# Patient Record
Sex: MALE | Race: WHITE | Employment: OTHER | URBAN - METROPOLITAN AREA
[De-identification: names, ages, dates, MRNs, and addresses within clinical notes are randomized per-mention and may not be internally consistent; named-entity substitution may affect disease eponyms.]

---

## 2018-11-30 NOTE — ED AVS SNAPSHOT
Rakesh Jang   MRN: [id number]    Department:  [facility] Emergency Department   Date of Visit:  11/2/2019           Disclosure     Insurance plans vary and the physician(s) referred by the ER may not be covered by your plan.  Please contact your Verified Results  CBC WITH AUTOMATED DIFFERENTIAL [id number] 12:04PM YAMILETH GILES     Test Name Result Flag Reference   WHITE BLOOD COUNT 4.5 K/mcL  4.2-11.0   RED CELL COUNT 4.10 mil/mcL  4.00-5.20   HEMOGLOBIN 12.8 g/dl  12.0-15.5   HEMATOCRIT 38.5 %  36.0-46.5   MEAN CORPUSCULAR VOLUME 93.9 fL  78.0-100.0   MEAN CORPUSCULAR HEMOGLOBIN 31.2 pg  26.0-34.0   MEAN CORPUSCULAR HGB CONC 33.2 g/dl  32.0-36.5   RDW-CV 11.9 %  11.0-15.0   PLATELET COUNT 128 K/mcL  140-450   LORETO% 52 %     LYM% 37 %     MON% 9 %     EOS% 1 %     BASO% 1 %     LORETO ABS 2.4 K/mcL  1.8-7.7   LYM ABS 1.7 K/mcL  1.0-4.0   MON ABS 0.4 K/mcL  0.3-0.9   EOS ABS 0.1 K/mcL  0.1-0.5   BASO ABS 0.0 K/mcL  0.0-0.3   DIFF TYPE      AUTOMATED DIFFERENTIAL   NRBC 0 /100 WBC  0   PERCENT IMMATURE GRANULOCYTES 0 %     ABSOLUTE IMMATURE GRANULOCYTES 0.0 K/mcL  0-5.0     COMP METABOLIC PNL [id number] 84:23HU Ming Max     Test Name Result Flag Reference   SODIUM 138 mmol/L  135-145   POTASSIUM 4.0 mmol/L  3.4-5.1   CHLORIDE 101 mmol/L     CARBON DIOXIDE 28 mmol/L  21-32   ANION GAP 13 mmol/L  10-20   GLUCOSE 100 mg/dl H 65-99   BUN 15 mg/dl  6-20   CREATININE 0.77 mg/dl  0.51-0.95   GFR EST.  AMER >90     eGFR results = or >90 mL/min/1.73m2 = Normal kidney function. GFR EST. NONAFRI AMER 89     eGFR 60 - 89 mL/min/1.73m2 = Mild decrease in kidney function.    BUN/CREATININE RATIO 19  7-25   BILIRUBIN TOTAL 0.5 mg/dl  0.2-1.0   GOT/AST 15 Units/L  <38   ALKALINE PHOSPHATASE 80 Units/L     ALBUMIN 4.4 g/dl  3.6-5.1   TOTAL PROTEIN 7.1 g/dl  6.4-8.2   GLOBULIN (CALCULATED) 2.7 g/dl  2.0-4.0   A/G RATIO 1.6  1.0-2.4   CALCIUM 10.3 mg/dl H 8.4-10.2   GPT/ALT 22 Units/L  <79   FASTING STATUS 12 hrs       LIPID PNL [id number] 12:04PM YAMILETH GILES     Test Name Result Flag Reference   FASTING STATUS 12 hrs     CHOLESTEROL 264 mg/dl H <200   Desirable            <200  Borderline High      200 to 239  High                 >=240   HDL CHOLESTEROL 83 mg/dl  >49   Low            <40  Borderline Low 40 to 49  Near Optimal   50 to 59  Optimal        >=60   TRIGLYCERIDES 67 mg/dl  <150   Normal                   <150  Borderline High          150 to 199  High                     200 to 499  Very High                >=500   LDL CHOLESTEROL (CALCULATED) 168 mg/dl H <130   OPTIMAL               <100  NEAR OPTIMAL          100-129  BORDERLINE HIGH       130-159  HIGH                  160-189  VERY HIGH             >=190   NON-HDL CHOLESTEROL 181 mg/dl     Therapeutic Target:  CHD and risk equivalents <130  Multiple risk factors    <160  0 to 1 risk factors      <190   CHOLESTEROL/HDL RATIO 3.2  <4.5     HEMOGLOBIN A1C GLYCOSYLATED [id number] 12:04PM GILESYAMILETH MUNOZ     Test Name Result Flag Reference   HEMOGLOBIN A1C GLYH 5.2 %  4.5-5.6   ----DIABETIC SCREENING---  NON DIABETIC                 <5.7%  INCREASED RISK                5.7-6.4%  DIAGNOSTIC FOR DIABETES      >6.4%     ----DIABETIC CONTROL---     A1C%           eAG mg/dL  6.0            126  6.5            140  7.0            154  7.5            169  8.0            183  8.5            197  9.0            212  9.5            226  10.0           240     TSH WITH REFLEX [id number] 12:04PM GILES, YAMILETH     Test Name Result Flag Reference   TSH 1.424 mcUnits/mL  0.350-5.000   Findings most consistent with euthyroid state, no additional testing suggested. TSH may be normal in patients with thyroid dysfunction and pituitary disease. Clinical correlation recommended. (Reflex TSH algorithm is not recommended in hospitalized patients. A variety of drugs, as well as serious acute and chronic illnesses may alter thyroid function tests.  Commonly implicated drugs include glucocorticoids, dopamine, carbamazepine, iodine, amiodarone, lithium and heparin.)     VITAMIN D,25 HYDROXY [id number] 12:04PM GILESYAMILETH MUNOZ     Test Name Result Flag Reference   VIT D,25 HYDROXY 61.7 ng/ml  30.0-100.0   <20  ng/mL=Vitamin D within the next three months to obtain basic health screening including reassessment of your blood pressure.     IF THERE IS ANY CHANGE OR WORSENING OF YOUR CONDITION, CALL YOUR PRIMARY CARE PHYSICIAN AT ONCE OR RETURN IMMEDIATELY TO THE EMERGENCY DEPARTMEN deficiency  20-29  ng/mL=Vitamin D insufficiency   ng/mL=Optimal Vitamin D  >150 ng/mL=Possible toxicity     VITAMIN B12 [id number] 12:04PM Ladena Mo     Test Name Result Flag Reference   VITAMIN B12 627 pg/ml  211-911       Message   Borderline increased calclum level. Recommend re-check in 4-6 weeks. No concerns otherwise. Ordered CMP.

## 2019-01-16 ENCOUNTER — HOSPITAL ENCOUNTER (OUTPATIENT)
Facility: HOSPITAL | Age: 84
Setting detail: OBSERVATION
Discharge: NURSING, REHAB OR LONG-TERM CARE | End: 2019-01-17
Attending: EMERGENCY MEDICINE | Admitting: INTERNAL MEDICINE
Payer: MEDICARE

## 2019-01-16 DIAGNOSIS — N39.0 URINARY TRACT INFECTION WITHOUT HEMATURIA, SITE UNSPECIFIED: Primary | ICD-10-CM

## 2019-01-16 DIAGNOSIS — R53.1 WEAKNESS GENERALIZED: ICD-10-CM

## 2019-01-16 LAB
ANION GAP SERPL CALC-SCNC: 14 MMOL/L (ref 0–18)
BASOPHILS # BLD: 0 K/UL (ref 0–0.2)
BASOPHILS NFR BLD: 0 %
BILIRUB UR QL: NEGATIVE
BUN SERPL-MCNC: 18 MG/DL (ref 8–20)
BUN/CREAT SERPL: 22.2 (ref 10–20)
CALCIUM SERPL-MCNC: 9.2 MG/DL (ref 8.5–10.5)
CHLORIDE SERPL-SCNC: 102 MMOL/L (ref 95–110)
CO2 SERPL-SCNC: 21 MMOL/L (ref 22–32)
COLOR UR: YELLOW
CREAT SERPL-MCNC: 0.81 MG/DL (ref 0.5–1.5)
EOSINOPHIL # BLD: 0.3 K/UL (ref 0–0.7)
EOSINOPHIL NFR BLD: 3 %
ERYTHROCYTE [DISTWIDTH] IN BLOOD BY AUTOMATED COUNT: 14.5 % (ref 11–15)
EST. AVERAGE GLUCOSE BLD GHB EST-MCNC: 143 MG/DL (ref 68–126)
GLUCOSE BLDC GLUCOMTR-MCNC: 111 MG/DL (ref 70–99)
GLUCOSE BLDC GLUCOMTR-MCNC: 145 MG/DL (ref 70–99)
GLUCOSE BLDC GLUCOMTR-MCNC: 196 MG/DL (ref 70–99)
GLUCOSE SERPL-MCNC: 151 MG/DL (ref 70–99)
GLUCOSE UR-MCNC: NEGATIVE MG/DL
HBA1C MFR BLD HPLC: 6.6 % (ref ?–5.7)
HCT VFR BLD AUTO: 42.5 % (ref 41–52)
HGB BLD-MCNC: 14.5 G/DL (ref 13.5–17.5)
KETONES UR-MCNC: NEGATIVE MG/DL
LYMPHOCYTES # BLD: 0.9 K/UL (ref 1–4)
LYMPHOCYTES NFR BLD: 9 %
MCH RBC QN AUTO: 30.7 PG (ref 27–32)
MCHC RBC AUTO-ENTMCNC: 34 G/DL (ref 32–37)
MCV RBC AUTO: 90.3 FL (ref 80–100)
MONOCYTES # BLD: 0.5 K/UL (ref 0–1)
MONOCYTES NFR BLD: 6 %
NEUTROPHILS # BLD AUTO: 7.7 K/UL (ref 1.8–7.7)
NEUTROPHILS NFR BLD: 82 %
NITRITE UR QL STRIP.AUTO: POSITIVE
OSMOLALITY UR CALC.SUM OF ELEC: 289 MOSM/KG (ref 275–295)
PH UR: 5 [PH] (ref 5–8)
PLATELET # BLD AUTO: 177 K/UL (ref 140–400)
PMV BLD AUTO: 8.9 FL (ref 7.4–10.3)
POTASSIUM SERPL-SCNC: 3.3 MMOL/L (ref 3.3–5.1)
PROT UR-MCNC: 100 MG/DL
RBC # BLD AUTO: 4.7 M/UL (ref 4.5–5.9)
RBC #/AREA URNS AUTO: 318 /HPF
SODIUM SERPL-SCNC: 137 MMOL/L (ref 136–144)
SP GR UR STRIP: 1.02 (ref 1–1.03)
UROBILINOGEN UR STRIP-ACNC: <2
VIT C UR-MCNC: NEGATIVE MG/DL
WBC # BLD AUTO: 9.4 K/UL (ref 4–11)
WBC #/AREA URNS AUTO: 1042 /HPF

## 2019-01-16 PROCEDURE — 83036 HEMOGLOBIN GLYCOSYLATED A1C: CPT | Performed by: INTERNAL MEDICINE

## 2019-01-16 PROCEDURE — 96365 THER/PROPH/DIAG IV INF INIT: CPT

## 2019-01-16 PROCEDURE — 85025 COMPLETE CBC W/AUTO DIFF WBC: CPT | Performed by: EMERGENCY MEDICINE

## 2019-01-16 PROCEDURE — 87186 SC STD MICRODIL/AGAR DIL: CPT | Performed by: EMERGENCY MEDICINE

## 2019-01-16 PROCEDURE — 96361 HYDRATE IV INFUSION ADD-ON: CPT

## 2019-01-16 PROCEDURE — 80048 BASIC METABOLIC PNL TOTAL CA: CPT | Performed by: EMERGENCY MEDICINE

## 2019-01-16 PROCEDURE — 85025 COMPLETE CBC W/AUTO DIFF WBC: CPT

## 2019-01-16 PROCEDURE — 87077 CULTURE AEROBIC IDENTIFY: CPT | Performed by: EMERGENCY MEDICINE

## 2019-01-16 PROCEDURE — 82962 GLUCOSE BLOOD TEST: CPT

## 2019-01-16 PROCEDURE — 93010 ELECTROCARDIOGRAM REPORT: CPT | Performed by: EMERGENCY MEDICINE

## 2019-01-16 PROCEDURE — 93005 ELECTROCARDIOGRAM TRACING: CPT

## 2019-01-16 PROCEDURE — 99285 EMERGENCY DEPT VISIT HI MDM: CPT

## 2019-01-16 PROCEDURE — 87086 URINE CULTURE/COLONY COUNT: CPT | Performed by: EMERGENCY MEDICINE

## 2019-01-16 PROCEDURE — 80048 BASIC METABOLIC PNL TOTAL CA: CPT

## 2019-01-16 PROCEDURE — 81001 URINALYSIS AUTO W/SCOPE: CPT | Performed by: EMERGENCY MEDICINE

## 2019-01-16 RX ORDER — GABAPENTIN 100 MG/1
100 CAPSULE ORAL NIGHTLY
Status: DISCONTINUED | OUTPATIENT
Start: 2019-01-16 | End: 2019-01-17

## 2019-01-16 RX ORDER — MULTIVIT-MIN/IRON/FOLIC ACID/K 18-600-40
2000 CAPSULE ORAL DAILY
COMMUNITY

## 2019-01-16 RX ORDER — CLOPIDOGREL BISULFATE 75 MG/1
75 TABLET ORAL DAILY
Status: DISCONTINUED | OUTPATIENT
Start: 2019-01-17 | End: 2019-01-17

## 2019-01-16 RX ORDER — FINASTERIDE 5 MG/1
5 TABLET, FILM COATED ORAL DAILY
Status: DISCONTINUED | OUTPATIENT
Start: 2019-01-17 | End: 2019-01-17

## 2019-01-16 RX ORDER — ENOXAPARIN SODIUM 100 MG/ML
40 INJECTION SUBCUTANEOUS DAILY
Status: DISCONTINUED | OUTPATIENT
Start: 2019-01-17 | End: 2019-01-17

## 2019-01-16 RX ORDER — PENTOXIFYLLINE 400 MG/1
400 TABLET, EXTENDED RELEASE ORAL 2 TIMES DAILY
COMMUNITY

## 2019-01-16 RX ORDER — POTASSIUM CHLORIDE 20 MEQ/1
40 TABLET, EXTENDED RELEASE ORAL EVERY 4 HOURS
Status: COMPLETED | OUTPATIENT
Start: 2019-01-16 | End: 2019-01-16

## 2019-01-16 RX ORDER — ATORVASTATIN CALCIUM 10 MG/1
10 TABLET, FILM COATED ORAL NIGHTLY
Status: DISCONTINUED | OUTPATIENT
Start: 2019-01-16 | End: 2019-01-17

## 2019-01-16 RX ORDER — POTASSIUM CITRATE 10 MEQ/1
10 TABLET, EXTENDED RELEASE ORAL 2 TIMES DAILY
COMMUNITY

## 2019-01-16 RX ORDER — POTASSIUM CITRATE 10 MEQ/1
10 TABLET, EXTENDED RELEASE ORAL 2 TIMES DAILY
Status: DISCONTINUED | OUTPATIENT
Start: 2019-01-16 | End: 2019-01-17

## 2019-01-16 RX ORDER — ENALAPRIL MALEATE 2.5 MG/1
2.5 TABLET ORAL DAILY
Status: DISCONTINUED | OUTPATIENT
Start: 2019-01-17 | End: 2019-01-17

## 2019-01-16 RX ORDER — METOPROLOL SUCCINATE 25 MG/1
12.5 TABLET, EXTENDED RELEASE ORAL
Status: DISCONTINUED | OUTPATIENT
Start: 2019-01-16 | End: 2019-01-17

## 2019-01-16 RX ORDER — PENTOXIFYLLINE 400 MG/1
400 TABLET, EXTENDED RELEASE ORAL 2 TIMES DAILY
Status: DISCONTINUED | OUTPATIENT
Start: 2019-01-16 | End: 2019-01-17

## 2019-01-16 RX ORDER — DEXTROSE MONOHYDRATE 25 G/50ML
50 INJECTION, SOLUTION INTRAVENOUS AS NEEDED
Status: DISCONTINUED | OUTPATIENT
Start: 2019-01-16 | End: 2019-01-17

## 2019-01-16 NOTE — ED NOTES
Orders for admission, patient is aware of plan and ready to go upstairs.  Any questions, please call ED RN [facility] at [address]

## 2019-01-16 NOTE — ED PROVIDER NOTES
Patient Seen in: [facility] AND [facility] Emergency Department    History   Patient presents with:  Weakness    Stated Complaint: right sided weakness    HPI    Patient is an 80-year-old male who arrives from [facility] assisted living for generalized weakness. distension  Extremities: FROM of all extremities, no cyanosis/clubbing/edema  Neuro: CN intact, normal speech, no pronator drift.  5/5 motor strength in all extremities, no focal deficits  SKIN: warm, dry, no rashes        ED Course     Labs Reviewed   URIN Readings from Last 1 Encounters:  01/16/19 : 81  , sinus       PHYSICIAN NOTE:  Patient received IV fluids. IV Rocephin given. Suspect UTI/cystitis as cause of patient's profound general weakness. No evidence of sepsis.  Patient has no neurologic deficits

## 2019-01-16 NOTE — ED INITIAL ASSESSMENT (HPI)
Pt to ED crying, A/O x 2 per norm- states he has not been feeling well. EMS state that pt is normally able to get up and transfer himself to a chair but was unable to do that today.  Nursing staff noticed the pt had right upper extremity weakness at 0900 to

## 2019-01-17 VITALS
WEIGHT: 148.88 LBS | HEIGHT: 65 IN | HEART RATE: 74 BPM | OXYGEN SATURATION: 95 % | DIASTOLIC BLOOD PRESSURE: 65 MMHG | RESPIRATION RATE: 18 BRPM | SYSTOLIC BLOOD PRESSURE: 155 MMHG | TEMPERATURE: 98 F | BODY MASS INDEX: 24.8 KG/M2

## 2019-01-17 LAB
ANION GAP SERPL CALC-SCNC: 7 MMOL/L (ref 0–18)
BUN SERPL-MCNC: 16 MG/DL (ref 8–20)
BUN/CREAT SERPL: 19.8 (ref 10–20)
CALCIUM SERPL-MCNC: 8.5 MG/DL (ref 8.5–10.5)
CHLORIDE SERPL-SCNC: 107 MMOL/L (ref 95–110)
CO2 SERPL-SCNC: 24 MMOL/L (ref 22–32)
CREAT SERPL-MCNC: 0.81 MG/DL (ref 0.5–1.5)
GLUCOSE BLDC GLUCOMTR-MCNC: 124 MG/DL (ref 70–99)
GLUCOSE BLDC GLUCOMTR-MCNC: 149 MG/DL (ref 70–99)
GLUCOSE SERPL-MCNC: 116 MG/DL (ref 70–99)
OSMOLALITY UR CALC.SUM OF ELEC: 288 MOSM/KG (ref 275–295)
POTASSIUM SERPL-SCNC: 4.1 MMOL/L (ref 3.3–5.1)
POTASSIUM SERPL-SCNC: 4.1 MMOL/L (ref 3.3–5.1)
SODIUM SERPL-SCNC: 138 MMOL/L (ref 136–144)

## 2019-01-17 PROCEDURE — 82962 GLUCOSE BLOOD TEST: CPT

## 2019-01-17 PROCEDURE — 97162 PT EVAL MOD COMPLEX 30 MIN: CPT

## 2019-01-17 PROCEDURE — 97166 OT EVAL MOD COMPLEX 45 MIN: CPT

## 2019-01-17 PROCEDURE — 84132 ASSAY OF SERUM POTASSIUM: CPT | Performed by: INTERNAL MEDICINE

## 2019-01-17 PROCEDURE — 80048 BASIC METABOLIC PNL TOTAL CA: CPT | Performed by: INTERNAL MEDICINE

## 2019-01-17 PROCEDURE — 96372 THER/PROPH/DIAG INJ SC/IM: CPT

## 2019-01-17 RX ORDER — CIPROFLOXACIN 250 MG/1
250 TABLET, FILM COATED ORAL 2 TIMES DAILY
Qty: 24 TABLET | Refills: 0 | Status: SHIPPED | OUTPATIENT
Start: 2019-01-17 | End: 2019-01-29

## 2019-01-17 NOTE — DISCHARGE SUMMARY
[facility]    Discharge Summary    Sae Baltazar Patient Status:  Observation    4/15/1934 MRN [id number]   Location [facility]W Attending Claudine Juarez,    Hosp Day # 0 PCP Carlo Aguilar MD, Josefina Dyson     Date of Admission: 2019 ciprofloxacin pending cultures.         Complications: None    Consultants     Provider Role Specialty    Ced Solo,  Consulting Physician  Internal Medicine          Pending Labs     Order Current Status    URINE CULTURE, ROUTINE In process

## 2019-01-17 NOTE — OCCUPATIONAL THERAPY NOTE
OCCUPATIONAL THERAPY EVALUATION - INPATIENT     Room Number: 034/754-T  Evaluation Date: 1/17/2019  Type of Evaluation: Initial  Presenting Problem: (UTI)    Physician Order: IP Consult to Occupational Therapy  Reason for Therapy: ADL/IADL Dysfunction and this time. Educated pt and son on POC while at [address] [place] [address], encouraged to sit up to chair for at least an hour and use of call light. RN aware of findings. DISCHARGE RECOMMENDATIONS  OT Discharge Recommendations: 24 hour care/supervision; Long term care(back t A&Ox1-2 at baseline  Pt responds to questions inconsistently   Follows 1 step commands with repetition    VISION  Appears WFL - will continue to assess PRN    Communication: responds to questions inconsistently during evaluation, able to make needs known of Session: Up in chair;Needs met;Call light within reach;RN aware of session/findings; All patient questions and concerns addressed; Alarm set; Family present    OT Goals  Patients self stated goal is: unable to state     Patient will complete functional tra

## 2019-01-17 NOTE — PHYSICAL THERAPY NOTE
PHYSICAL THERAPY EVALUATION - INPATIENT     Room Number: 683/736-A  Evaluation Date: 1/17/2019  Type of Evaluation: Initial   Physician Order: PT Eval and Treat    Presenting Problem: weakness, + UTI  Reason for Therapy: Mobility Dysfunction and Discharge for his dad. Difficult to recreate pt's home environment in the hospital to determine his true level of function but do anticipate he will need at least 1 person hands on assistance in order to transfer to/from the w/c upon initial d/c.  Unsure if pt's assi ASSESSMENT  Rating: (Did not rate)          COGNITION  · Overall Cognitive Status:  Impaired  · Orientation Level:  oriented to place, oriented to person, disoriented to time and disoriented to situation  · Memory:  decreased long term memory and decreased training    Patient End of Session: Up in chair;Needs met;Call light within reach;RN aware of session/findings; All patient questions and concerns addressed; Alarm set; Family present    CURRENT GOALS    Goals to be met by: 1/24/19  Patient Goal Patient's florence

## 2019-01-17 NOTE — H&P
[address], [address] Patient Status:  Observation    4/15/1934 MRN [id number]   Location [address] [facility] Attending Lizbeth Albright, DO   Hosp Day # 0 PCP Blaine Duggan MD, Maurice Hay     Date:  2019  Date of Adm Admission:  metoprolol succinate 12.5 mg Oral Tab Take 12.5 mg by mouth 2x Daily(Beta Blocker). Pentoxifylline  MG Oral Tab CR Take 400 mg by mouth 2 (two) times daily.    Potassium Citrate ER 10 MEQ (1080 MG) Oral Tab CR Take 10 mEq by mouth 2 (two extremities  Musculoskeletal: Full range of motion of all extremities.   Integument: Warm, dry, no rash  Psychiatric: Labile mood, initially cheerful, later tearful      Results:     Lab Results   Component Value Date    WBC 9.4 01/16/2019    HGB 14.5 01/16

## 2019-01-17 NOTE — PLAN OF CARE
Problem: Patient Centered Care  Goal: Patient preferences are identified and integrated in the patient's plan of care  Interventions:  - What would you like us to know as we care for you?    - Provide timely, complete, and accurate information to patient/f for wounds/incisions during mobilization  - Obtain PT/OT consults as needed  - Advance activity as appropriate  - Communicate ordered activity level and limitations with patient/family  Outcome: Completed Date Met: 01/17/19      Problem: SAFETY ADULT - FAL

## 2019-01-17 NOTE — PLAN OF CARE
Problem: Patient Centered Care  Goal: Patient preferences are identified and integrated in the patient's plan of care  Interventions:  - What would you like us to know as we care for you?   - Provide timely, complete, and accurate information to patient/fa needed  - Advance activity as appropriate  - Communicate ordered activity level and limitations with patient/family  Outcome: Progressing      Problem: SAFETY ADULT - FALL  Goal: Free from fall injury  INTERVENTIONS:  - Assess pt frequently for physical ne

## 2019-01-17 NOTE — PLAN OF CARE
Problem: Patient Centered Care  Goal: Patient preferences are identified and integrated in the patient's plan of care  Interventions:  - What would you like us to know as we care for you?  Pt from [facility] assisted living  - Provide timely, complete, and a protection for wounds/incisions during mobilization  - Obtain PT/OT consults as needed  - Advance activity as appropriate  - Communicate ordered activity level and limitations with patient/family  Outcome: Adequate for Discharge      Problem: SAFETY ADULT

## 2019-02-11 ENCOUNTER — HOSPITAL ENCOUNTER (EMERGENCY)
Facility: HOSPITAL | Age: 84
Discharge: HOME | End: 2019-02-12
Attending: EMERGENCY MEDICINE
Payer: MEDICARE

## 2019-02-11 ENCOUNTER — APPOINTMENT (OUTPATIENT)
Dept: GENERAL RADIOLOGY | Facility: HOSPITAL | Age: 84
End: 2019-02-11
Attending: EMERGENCY MEDICINE
Payer: MEDICARE
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
## 2019-02-11 DIAGNOSIS — B34.8 RHINOVIRUS: ICD-10-CM

## 2019-02-11 DIAGNOSIS — R50.9 FEVER, UNSPECIFIED FEVER CAUSE: ICD-10-CM

## 2019-02-11 LAB
BASOPHILS # BLD AUTO: 0.03 X10(3) UL (ref 0–0.2)
BASOPHILS NFR BLD AUTO: 0.3 %
BILIRUB UR QL: NEGATIVE
CLARITY UR: CLEAR
COLOR UR: YELLOW
DEPRECATED RDW RBC AUTO: 42.6 FL (ref 35.1–46.3)
EOSINOPHIL # BLD AUTO: 0.15 X10(3) UL (ref 0–0.7)
EOSINOPHIL NFR BLD AUTO: 1.7 %
ERYTHROCYTE [DISTWIDTH] IN BLOOD BY AUTOMATED COUNT: 13 % (ref 11–15)
GLUCOSE UR-MCNC: NEGATIVE MG/DL
HCT VFR BLD AUTO: 38.9 % (ref 39–53)
HGB BLD-MCNC: 13.4 G/DL (ref 13–17.5)
HGB UR QL STRIP.AUTO: NEGATIVE
IMM GRANULOCYTES # BLD AUTO: 0.03 X10(3) UL (ref 0–1)
IMM GRANULOCYTES NFR BLD: 0.3 %
KETONES UR-MCNC: NEGATIVE MG/DL
LEUKOCYTE ESTERASE UR QL STRIP.AUTO: NEGATIVE
LYMPHOCYTES # BLD AUTO: 2.46 X10(3) UL (ref 1–4)
LYMPHOCYTES NFR BLD AUTO: 27.1 %
MCH RBC QN AUTO: 31.1 PG (ref 26–34)
MCHC RBC AUTO-ENTMCNC: 34.4 G/DL (ref 31–37)
MCV RBC AUTO: 90.3 FL (ref 80–100)
MONOCYTES # BLD AUTO: 0.54 X10(3) UL (ref 0.1–1)
MONOCYTES NFR BLD AUTO: 5.9 %
NEUTROPHILS # BLD AUTO: 5.88 X10 (3) UL (ref 1.5–7.7)
NEUTROPHILS # BLD AUTO: 5.88 X10(3) UL (ref 1.5–7.7)
NEUTROPHILS NFR BLD AUTO: 64.7 %
NITRITE UR QL STRIP.AUTO: NEGATIVE
PH UR: 7 [PH] (ref 5–8)
PLATELET # BLD AUTO: 136 10(3)UL (ref 150–450)
PROT UR-MCNC: NEGATIVE MG/DL
RBC # BLD AUTO: 4.31 X10(6)UL (ref 3.8–5.8)
RBC #/AREA URNS AUTO: 2 /HPF
SP GR UR STRIP: 1.02 (ref 1–1.03)
UROBILINOGEN UR STRIP-ACNC: <2
VIT C UR-MCNC: 20 MG/DL
WBC # BLD AUTO: 9.1 X10(3) UL (ref 4–11)
WBC #/AREA URNS AUTO: 3 /HPF

## 2019-02-11 PROCEDURE — 83735 ASSAY OF MAGNESIUM: CPT | Performed by: EMERGENCY MEDICINE

## 2019-02-11 PROCEDURE — 81003 URINALYSIS AUTO W/O SCOPE: CPT | Performed by: EMERGENCY MEDICINE

## 2019-02-11 PROCEDURE — 96360 HYDRATION IV INFUSION INIT: CPT

## 2019-02-11 PROCEDURE — 85025 COMPLETE CBC W/AUTO DIFF WBC: CPT | Performed by: EMERGENCY MEDICINE

## 2019-02-11 PROCEDURE — 71045 X-RAY EXAM CHEST 1 VIEW: CPT | Performed by: EMERGENCY MEDICINE

## 2019-02-11 PROCEDURE — 87486 CHLMYD PNEUM DNA AMP PROBE: CPT | Performed by: EMERGENCY MEDICINE

## 2019-02-11 PROCEDURE — 87633 RESP VIRUS 12-25 TARGETS: CPT | Performed by: EMERGENCY MEDICINE

## 2019-02-11 PROCEDURE — 80048 BASIC METABOLIC PNL TOTAL CA: CPT | Performed by: EMERGENCY MEDICINE

## 2019-02-11 PROCEDURE — 87798 DETECT AGENT NOS DNA AMP: CPT | Performed by: EMERGENCY MEDICINE

## 2019-02-11 PROCEDURE — 99285 EMERGENCY DEPT VISIT HI MDM: CPT

## 2019-02-11 PROCEDURE — 87581 M.PNEUMON DNA AMP PROBE: CPT | Performed by: EMERGENCY MEDICINE

## 2019-02-12 ENCOUNTER — APPOINTMENT (OUTPATIENT)
Dept: CT IMAGING | Facility: HOSPITAL | Age: 84
End: 2019-02-12
Attending: EMERGENCY MEDICINE
Payer: MEDICARE

## 2019-02-12 VITALS
TEMPERATURE: 100 F | RESPIRATION RATE: 16 BRPM | SYSTOLIC BLOOD PRESSURE: 151 MMHG | HEART RATE: 87 BPM | DIASTOLIC BLOOD PRESSURE: 62 MMHG | HEIGHT: 66 IN | BODY MASS INDEX: 25.71 KG/M2 | WEIGHT: 160 LBS | OXYGEN SATURATION: 91 %

## 2019-02-12 LAB
ADENOVIRUS PCR:: NEGATIVE
ANION GAP SERPL CALC-SCNC: 14 MMOL/L (ref 0–18)
B PERT DNA SPEC QL NAA+PROBE: NEGATIVE
BUN SERPL-MCNC: 11 MG/DL (ref 8–20)
BUN/CREAT SERPL: 12.9 (ref 10–20)
C PNEUM DNA SPEC QL NAA+PROBE: NEGATIVE
CALCIUM SERPL-MCNC: 8.9 MG/DL (ref 8.5–10.5)
CHLORIDE SERPL-SCNC: 100 MMOL/L (ref 95–110)
CO2 SERPL-SCNC: 22 MMOL/L (ref 22–32)
CORONAVIRUS 229E PCR:: NEGATIVE
CORONAVIRUS HKU1 PCR:: NEGATIVE
CORONAVIRUS NL63 PCR:: NEGATIVE
CORONAVIRUS OC43 PCR:: NEGATIVE
CREAT SERPL-MCNC: 0.85 MG/DL (ref 0.5–1.5)
FLUAV RNA SPEC QL NAA+PROBE: NEGATIVE
FLUBV RNA SPEC QL NAA+PROBE: NEGATIVE
GLUCOSE SERPL-MCNC: 176 MG/DL (ref 70–99)
MAGNESIUM SERPL-MCNC: 1.7 MG/DL (ref 1.8–2.5)
METAPNEUMOVIRUS PCR:: NEGATIVE
MYCOPLASMA PNEUMONIA PCR:: NEGATIVE
OSMOLALITY UR CALC.SUM OF ELEC: 286 MOSM/KG (ref 275–295)
PARAINFLUENZA 1 PCR:: NEGATIVE
PARAINFLUENZA 2 PCR:: NEGATIVE
PARAINFLUENZA 3 PCR:: NEGATIVE
PARAINFLUENZA 4 PCR:: NEGATIVE
POTASSIUM SERPL-SCNC: 3.8 MMOL/L (ref 3.3–5.1)
RHINOVIRUS/ENTERO PCR:: POSITIVE
RSV RNA SPEC QL NAA+PROBE: NEGATIVE
SODIUM SERPL-SCNC: 136 MMOL/L (ref 136–144)

## 2019-02-12 PROCEDURE — 70450 CT HEAD/BRAIN W/O DYE: CPT | Performed by: EMERGENCY MEDICINE

## 2019-02-12 PROCEDURE — 72131 CT LUMBAR SPINE W/O DYE: CPT | Performed by: EMERGENCY MEDICINE

## 2019-02-12 PROCEDURE — 72125 CT NECK SPINE W/O DYE: CPT | Performed by: EMERGENCY MEDICINE

## 2019-02-12 NOTE — ED INITIAL ASSESSMENT (HPI)
Pt arrive in ER per [place] [facility] with c/o \"pt was sliding to floor from his chair\" as per nursing home staff, per ems pt denies any pain and was wheezing, pt received breathing tx from ems pta

## 2019-02-12 NOTE — ED PROVIDER NOTES
Patient Seen in: [facility] AND [facility] Emergency Department    History   Patient presents with:  Fall (musculoskeletal, neurologic)    Stated Complaint: slide of his chair at nursing home    HPI    79 y/o male with reported baseline confusion with a history well-developed. No distress. Head: Normocephalic and atraumatic. Eyes: Conjunctivae are normal. Pupils are equal, round, and reactive to light. Neck: Normal range of motion. Neck supple. No obvious midline pain to palpation.   No obvious signs of tra orders were created for panel order CBC WITH DIFFERENTIAL WITH PLATELET.   Procedure                               Abnormality         Status                     ---------                               -----------         ------                     CBC W/ D pelvis with IV contrast if clinically indicated. The results were faxed/finalized only at 02/12/2019 at 01:56: ET. If you would like to discuss this case directly please call [phone] (extension [phone]).   If you can't reach me at this number, do not le possible for a visit in 1 day      We recommend that you schedule follow up care with a primary care provider within the next three months to obtain basic health screening including reassessment of your blood pressure.     Medications Prescribed:  Current D

## 2019-02-25 ENCOUNTER — HOSPITAL ENCOUNTER (INPATIENT)
Facility: HOSPITAL | Age: 84
LOS: 5 days | Discharge: NURSING, REHAB OR LONG-TERM CARE | DRG: 193 | End: 2019-03-02
Attending: EMERGENCY MEDICINE | Admitting: HOSPITALIST
Payer: MEDICARE

## 2019-02-25 ENCOUNTER — APPOINTMENT (OUTPATIENT)
Dept: GENERAL RADIOLOGY | Facility: HOSPITAL | Age: 84
DRG: 193 | End: 2019-02-25
Attending: EMERGENCY MEDICINE
Payer: MEDICARE

## 2019-02-25 DIAGNOSIS — J40 BRONCHITIS: ICD-10-CM

## 2019-02-25 DIAGNOSIS — M51.37 DEGENERATION OF LUMBAR OR LUMBOSACRAL INTERVERTEBRAL DISC: ICD-10-CM

## 2019-02-25 DIAGNOSIS — J96.01 ACUTE RESPIRATORY FAILURE WITH HYPOXIA (HCC): Primary | ICD-10-CM

## 2019-02-25 DIAGNOSIS — M54.17 LUMBOSACRAL RADICULOPATHY AT L4: ICD-10-CM

## 2019-02-25 DIAGNOSIS — R26.89 MULTIFACTORIAL GAIT DISORDER: ICD-10-CM

## 2019-02-25 DIAGNOSIS — M54.2 CERVICALGIA: ICD-10-CM

## 2019-02-25 DIAGNOSIS — R29.2 GENERALIZED HYPERREFLEXIA: ICD-10-CM

## 2019-02-25 LAB
ADENOVIRUS PCR:: NEGATIVE
ANION GAP SERPL CALC-SCNC: 10 MMOL/L (ref 0–18)
B PERT DNA SPEC QL NAA+PROBE: NEGATIVE
BASOPHILS # BLD AUTO: 0.05 X10(3) UL (ref 0–0.2)
BASOPHILS NFR BLD AUTO: 0.4 %
BILIRUB UR QL: NEGATIVE
BUN BLD-MCNC: 20 MG/DL (ref 7–18)
BUN/CREAT SERPL: 22.5 (ref 10–20)
C PNEUM DNA SPEC QL NAA+PROBE: NEGATIVE
CALCIUM BLD-MCNC: 9.2 MG/DL (ref 8.5–10.1)
CHLORIDE SERPL-SCNC: 105 MMOL/L (ref 98–107)
CLARITY UR: CLEAR
CO2 SERPL-SCNC: 23 MMOL/L (ref 21–32)
COLOR UR: YELLOW
CORONAVIRUS 229E PCR:: NEGATIVE
CORONAVIRUS HKU1 PCR:: NEGATIVE
CORONAVIRUS NL63 PCR:: NEGATIVE
CORONAVIRUS OC43 PCR:: NEGATIVE
CREAT BLD-MCNC: 0.89 MG/DL (ref 0.7–1.3)
DEPRECATED RDW RBC AUTO: 40.7 FL (ref 35.1–46.3)
EOSINOPHIL # BLD AUTO: 0.02 X10(3) UL (ref 0–0.7)
EOSINOPHIL NFR BLD AUTO: 0.1 %
ERYTHROCYTE [DISTWIDTH] IN BLOOD BY AUTOMATED COUNT: 12.5 % (ref 11–15)
EST. AVERAGE GLUCOSE BLD GHB EST-MCNC: 148 MG/DL (ref 68–126)
FLUAV RNA SPEC QL NAA+PROBE: NEGATIVE
FLUBV RNA SPEC QL NAA+PROBE: NEGATIVE
GLUCOSE BLD-MCNC: 167 MG/DL (ref 70–99)
GLUCOSE BLDC GLUCOMTR-MCNC: 142 MG/DL (ref 70–99)
GLUCOSE BLDC GLUCOMTR-MCNC: 182 MG/DL (ref 70–99)
GLUCOSE UR-MCNC: NEGATIVE MG/DL
HBA1C MFR BLD HPLC: 6.8 % (ref ?–5.7)
HCT VFR BLD AUTO: 41.3 % (ref 39–53)
HGB BLD-MCNC: 13.9 G/DL (ref 13–17.5)
HGB UR QL STRIP.AUTO: NEGATIVE
IMM GRANULOCYTES # BLD AUTO: 0.08 X10(3) UL (ref 0–1)
IMM GRANULOCYTES NFR BLD: 0.6 %
KETONES UR-MCNC: 20 MG/DL
LEUKOCYTE ESTERASE UR QL STRIP.AUTO: NEGATIVE
LYMPHOCYTES # BLD AUTO: 1.76 X10(3) UL (ref 1–4)
LYMPHOCYTES NFR BLD AUTO: 12.4 %
MCH RBC QN AUTO: 29.8 PG (ref 26–34)
MCHC RBC AUTO-ENTMCNC: 33.7 G/DL (ref 31–37)
MCV RBC AUTO: 88.4 FL (ref 80–100)
METAPNEUMOVIRUS PCR:: NEGATIVE
MONOCYTES # BLD AUTO: 0.79 X10(3) UL (ref 0.1–1)
MONOCYTES NFR BLD AUTO: 5.6 %
MRSA DNA SPEC QL NAA+PROBE: NEGATIVE
MYCOPLASMA PNEUMONIA PCR:: NEGATIVE
NEUTROPHILS # BLD AUTO: 11.5 X10 (3) UL (ref 1.5–7.7)
NEUTROPHILS # BLD AUTO: 11.5 X10(3) UL (ref 1.5–7.7)
NEUTROPHILS NFR BLD AUTO: 80.9 %
NITRITE UR QL STRIP.AUTO: NEGATIVE
OSMOLALITY SERPL CALC.SUM OF ELEC: 292 MOSM/KG (ref 275–295)
PARAINFLUENZA 1 PCR:: NEGATIVE
PARAINFLUENZA 2 PCR:: NEGATIVE
PARAINFLUENZA 3 PCR:: NEGATIVE
PARAINFLUENZA 4 PCR:: NEGATIVE
PH UR: 5 [PH] (ref 5–8)
PLATELET # BLD AUTO: 252 10(3)UL (ref 150–450)
POTASSIUM SERPL-SCNC: 3.6 MMOL/L (ref 3.5–5.1)
PROCALCITONIN SERPL-MCNC: <0.05 NG/ML (ref ?–0.11)
PROT UR-MCNC: NEGATIVE MG/DL
RBC # BLD AUTO: 4.67 X10(6)UL (ref 3.8–5.8)
RBC #/AREA URNS AUTO: 3 /HPF
RHINOVIRUS/ENTERO PCR:: NEGATIVE
RSV RNA SPEC QL NAA+PROBE: NEGATIVE
SODIUM SERPL-SCNC: 138 MMOL/L (ref 136–145)
SP GR UR STRIP: 1.02 (ref 1–1.03)
UROBILINOGEN UR STRIP-ACNC: <2
VIT C UR-MCNC: NEGATIVE MG/DL
WBC # BLD AUTO: 14.2 X10(3) UL (ref 4–11)
WBC #/AREA URNS AUTO: 1 /HPF

## 2019-02-25 PROCEDURE — 71045 X-RAY EXAM CHEST 1 VIEW: CPT | Performed by: EMERGENCY MEDICINE

## 2019-02-25 PROCEDURE — 99223 1ST HOSP IP/OBS HIGH 75: CPT | Performed by: HOSPITALIST

## 2019-02-25 RX ORDER — AMLODIPINE BESYLATE 5 MG/1
5 TABLET ORAL DAILY
COMMUNITY

## 2019-02-25 RX ORDER — ATORVASTATIN CALCIUM 20 MG/1
20 TABLET, FILM COATED ORAL NIGHTLY
Status: DISCONTINUED | OUTPATIENT
Start: 2019-02-25 | End: 2019-03-02

## 2019-02-25 RX ORDER — METHYLPREDNISOLONE SODIUM SUCCINATE 40 MG/ML
40 INJECTION, POWDER, LYOPHILIZED, FOR SOLUTION INTRAMUSCULAR; INTRAVENOUS EVERY 12 HOURS
Status: DISCONTINUED | OUTPATIENT
Start: 2019-02-25 | End: 2019-02-27

## 2019-02-25 RX ORDER — GUAIFENESIN 100 MG/5ML
200 SOLUTION ORAL EVERY 4 HOURS PRN
Status: DISCONTINUED | OUTPATIENT
Start: 2019-02-25 | End: 2019-03-02

## 2019-02-25 RX ORDER — CHLORAL HYDRATE 500 MG
1000 CAPSULE ORAL DAILY
COMMUNITY

## 2019-02-25 RX ORDER — DEXTROSE MONOHYDRATE 25 G/50ML
50 INJECTION, SOLUTION INTRAVENOUS AS NEEDED
Status: DISCONTINUED | OUTPATIENT
Start: 2019-02-25 | End: 2019-03-02

## 2019-02-25 RX ORDER — GLIPIZIDE 10 MG/1
10 TABLET ORAL DAILY
Status: DISCONTINUED | OUTPATIENT
Start: 2019-02-26 | End: 2019-02-26

## 2019-02-25 RX ORDER — ONDANSETRON 2 MG/ML
4 INJECTION INTRAMUSCULAR; INTRAVENOUS EVERY 6 HOURS PRN
Status: DISCONTINUED | OUTPATIENT
Start: 2019-02-25 | End: 2019-03-02

## 2019-02-25 RX ORDER — SODIUM CHLORIDE 9 MG/ML
INJECTION, SOLUTION INTRAVENOUS CONTINUOUS
Status: DISCONTINUED | OUTPATIENT
Start: 2019-02-25 | End: 2019-02-27

## 2019-02-25 RX ORDER — HEPARIN SODIUM 5000 [USP'U]/ML
5000 INJECTION, SOLUTION INTRAVENOUS; SUBCUTANEOUS EVERY 12 HOURS SCHEDULED
Status: DISCONTINUED | OUTPATIENT
Start: 2019-02-25 | End: 2019-03-02

## 2019-02-25 RX ORDER — ATORVASTATIN CALCIUM 20 MG/1
20 TABLET, FILM COATED ORAL NIGHTLY
COMMUNITY

## 2019-02-25 RX ORDER — ONDANSETRON 2 MG/ML
4 INJECTION INTRAMUSCULAR; INTRAVENOUS EVERY 4 HOURS PRN
Status: DISCONTINUED | OUTPATIENT
Start: 2019-02-25 | End: 2019-03-02

## 2019-02-25 RX ORDER — POTASSIUM CITRATE 10 MEQ/1
10 TABLET, EXTENDED RELEASE ORAL 2 TIMES DAILY
Status: DISCONTINUED | OUTPATIENT
Start: 2019-02-25 | End: 2019-03-02

## 2019-02-25 RX ORDER — AMLODIPINE BESYLATE 5 MG/1
5 TABLET ORAL DAILY
Status: DISCONTINUED | OUTPATIENT
Start: 2019-02-26 | End: 2019-03-02

## 2019-02-25 RX ORDER — PENTOXIFYLLINE 400 MG/1
400 TABLET, EXTENDED RELEASE ORAL 2 TIMES DAILY
Status: DISCONTINUED | OUTPATIENT
Start: 2019-02-25 | End: 2019-03-02

## 2019-02-25 RX ORDER — SODIUM CHLORIDE 0.9 % (FLUSH) 0.9 %
3 SYRINGE (ML) INJECTION AS NEEDED
Status: DISCONTINUED | OUTPATIENT
Start: 2019-02-25 | End: 2019-03-02

## 2019-02-25 RX ORDER — HYDROCHLOROTHIAZIDE 25 MG/1
25 TABLET ORAL DAILY
Status: DISCONTINUED | OUTPATIENT
Start: 2019-02-26 | End: 2019-03-02

## 2019-02-25 RX ORDER — IPRATROPIUM BROMIDE AND ALBUTEROL SULFATE 2.5; .5 MG/3ML; MG/3ML
3 SOLUTION RESPIRATORY (INHALATION) EVERY 6 HOURS PRN
Status: DISCONTINUED | OUTPATIENT
Start: 2019-02-25 | End: 2019-03-02

## 2019-02-25 RX ORDER — ACETAMINOPHEN 325 MG/1
650 TABLET ORAL EVERY 6 HOURS PRN
Status: DISCONTINUED | OUTPATIENT
Start: 2019-02-25 | End: 2019-03-02

## 2019-02-25 NOTE — ED NOTES
Orders for admission, patient is aware of plan and ready to go upstairs.  Any questions, please call ED RN Brina Zelaya  at extension [phone]

## 2019-02-25 NOTE — H&P
[facility]    PATIENT'S NAME: Lien Britt   ATTENDING PHYSICIAN: Oneal Russo MD   PATIENT ACCOUNT#:   [id number]    LOCATION:  [place] [address]  MEDICAL RECORD #:   [id number]       YOB: 1934  ADMISSION DATE:       02/25/2019 PHYSICAL EXAMINATION:    GENERAL:  Alert and oriented to time, place, and person. Easily arousable, but drowsy. Falls asleep easily. VITAL SIGNS:  Temperature 99.0, pulse 87, respiratory rate 18, blood pressure 149/78, pulse ox 94% on room air.

## 2019-02-26 LAB
ANION GAP SERPL CALC-SCNC: 8 MMOL/L (ref 0–18)
BASOPHILS # BLD AUTO: 0.02 X10(3) UL (ref 0–0.2)
BASOPHILS NFR BLD AUTO: 0.2 %
BUN BLD-MCNC: 22 MG/DL (ref 7–18)
BUN/CREAT SERPL: 28.6 (ref 10–20)
CALCIUM BLD-MCNC: 8.6 MG/DL (ref 8.5–10.1)
CHLORIDE SERPL-SCNC: 108 MMOL/L (ref 98–107)
CO2 SERPL-SCNC: 22 MMOL/L (ref 21–32)
CREAT BLD-MCNC: 0.77 MG/DL (ref 0.7–1.3)
DEPRECATED RDW RBC AUTO: 40.6 FL (ref 35.1–46.3)
EOSINOPHIL # BLD AUTO: 0 X10(3) UL (ref 0–0.7)
EOSINOPHIL NFR BLD AUTO: 0 %
ERYTHROCYTE [DISTWIDTH] IN BLOOD BY AUTOMATED COUNT: 12.3 % (ref 11–15)
GLUCOSE BLD-MCNC: 262 MG/DL (ref 70–99)
GLUCOSE BLDC GLUCOMTR-MCNC: 250 MG/DL (ref 70–99)
GLUCOSE BLDC GLUCOMTR-MCNC: 262 MG/DL (ref 70–99)
GLUCOSE BLDC GLUCOMTR-MCNC: 273 MG/DL (ref 70–99)
GLUCOSE BLDC GLUCOMTR-MCNC: 302 MG/DL (ref 70–99)
HCT VFR BLD AUTO: 38.7 % (ref 39–53)
HGB BLD-MCNC: 13 G/DL (ref 13–17.5)
IMM GRANULOCYTES # BLD AUTO: 0.05 X10(3) UL (ref 0–1)
IMM GRANULOCYTES NFR BLD: 0.5 %
LYMPHOCYTES # BLD AUTO: 0.94 X10(3) UL (ref 1–4)
LYMPHOCYTES NFR BLD AUTO: 9 %
MCH RBC QN AUTO: 30.4 PG (ref 26–34)
MCHC RBC AUTO-ENTMCNC: 33.6 G/DL (ref 31–37)
MCV RBC AUTO: 90.6 FL (ref 80–100)
MONOCYTES # BLD AUTO: 0.08 X10(3) UL (ref 0.1–1)
MONOCYTES NFR BLD AUTO: 0.8 %
NEUTROPHILS # BLD AUTO: 9.38 X10 (3) UL (ref 1.5–7.7)
NEUTROPHILS # BLD AUTO: 9.38 X10(3) UL (ref 1.5–7.7)
NEUTROPHILS NFR BLD AUTO: 89.5 %
OSMOLALITY SERPL CALC.SUM OF ELEC: 298 MOSM/KG (ref 275–295)
PLATELET # BLD AUTO: 248 10(3)UL (ref 150–450)
POTASSIUM SERPL-SCNC: 3.9 MMOL/L (ref 3.5–5.1)
POTASSIUM SERPL-SCNC: 3.9 MMOL/L (ref 3.5–5.1)
RBC # BLD AUTO: 4.27 X10(6)UL (ref 3.8–5.8)
SODIUM SERPL-SCNC: 138 MMOL/L (ref 136–145)
WBC # BLD AUTO: 10.5 X10(3) UL (ref 4–11)

## 2019-02-26 PROCEDURE — 99233 SBSQ HOSP IP/OBS HIGH 50: CPT | Performed by: HOSPITALIST

## 2019-02-26 RX ORDER — IPRATROPIUM BROMIDE AND ALBUTEROL SULFATE 2.5; .5 MG/3ML; MG/3ML
3 SOLUTION RESPIRATORY (INHALATION)
Status: DISCONTINUED | OUTPATIENT
Start: 2019-02-26 | End: 2019-02-26

## 2019-02-26 RX ORDER — IPRATROPIUM BROMIDE AND ALBUTEROL SULFATE 2.5; .5 MG/3ML; MG/3ML
3 SOLUTION RESPIRATORY (INHALATION)
Status: DISCONTINUED | OUTPATIENT
Start: 2019-02-26 | End: 2019-02-27

## 2019-02-26 NOTE — ED PROVIDER NOTES
Patient Seen in: [facility] Emergency Department    History   Patient presents with:  Cough/URI      HPI    Patient presents to the ED from [facility] for increased weakness for the past several days with associated cough of green sputum chills and Disp:  Rfl:  2/25/2019 at 0900        No family history on file.     Smoking Status: Social History    Socioeconomic History      Marital status:       Spouse name: Not on file      Number of children: Not on file      Years of education: Not on file within normal limits   URINALYSIS WITH CULTURE REFLEX - Abnormal; Notable for the following components:    Ketones Urine 20  (*)     Bacteria Urine Few (*)     All other components within normal limits   HEMOGLOBIN A1C - Abnormal; Notable for the following 2/25/2019 at 14:38     Approved by (CST):  Lyric Howard MD on 2/25/2019 at 14:41          ED Medications Administered:   Medications   ondansetron HCl (ZOFRAN) injection 4 mg (not administered)   Normal Saline Flush 0.9 % injection 3 mL (3 mL Intraveno Right arm   Pulse: 87 89 92 89   Resp: 18 18 20 20   Temp:   97.7 °F (36.5 °C) 98.4 °F (36.9 °C)   TempSrc:   Oral Oral   SpO2: 94% 92% 97% 93%   Weight:       Height:         *I personally reviewed and interpreted all ED vitals.     Pulse Ox: 88%, Room air specified.     Medications Prescribed:  Current Discharge Medication List        Present on Admission           ICD-10-CM Noted POA    * (Principal) Acute respiratory failure with hypoxia ([facility] [address] J96.01 2/25/2019 Unknown    Bronchitis J40 2/25/2019

## 2019-02-26 NOTE — PLAN OF CARE
Problem: Patient Centered Care  Goal: Patient preferences are identified and integrated in the patient's plan of care  Interventions:  - What would you like us to know as we care for you?  Lives at [facility]  - Provide timely, complete, and accurate broncho-pulmonary hygiene including cough, deep breathe, Incentive Spirometry  - Assess the need for suctioning and perform as needed  - Assess and instruct to report SOB or any respiratory difficulty  - Respiratory Therapy support as indicated  - Manage/a

## 2019-02-26 NOTE — PROGRESS NOTES
[facility] HOSP - [facility]    Progress Note    Joe Trenton Patient Status:  Inpatient    4/15/1934 MRN [id number]   Location [facility] 5SW/SE Attending Castillo Martell MD   Hosp Day # 1 PCP Shanique Jensen MD, [facility]       SUBJECTIVE:    Feeling okay Medications:  insulin detemir (LEVEMIR) 100 UNIT/ML flextouch 12 Units 12 Units Subcutaneous Daily   ondansetron HCl (ZOFRAN) injection 4 mg 4 mg Intravenous Q4H PRN   Normal Saline Flush 0.9 % injection 3 mL 3 mL Intravenous PRN   0.9%  NaCl infusion  Int panel neg    Acute metabolic encephalopathy  - per son now at baseline  - possibly related to above  - monitor    DM  - hold oral meds  - started levemir and novolog  - monitor sugars with steroids    HTN  - monitor vitals and adjust meds prn     PVD  - co

## 2019-02-27 ENCOUNTER — APPOINTMENT (OUTPATIENT)
Dept: CT IMAGING | Facility: HOSPITAL | Age: 84
DRG: 193 | End: 2019-02-27
Attending: HOSPITALIST
Payer: MEDICARE

## 2019-02-27 LAB
ANION GAP SERPL CALC-SCNC: 8 MMOL/L (ref 0–18)
BASOPHILS # BLD AUTO: 0.01 X10(3) UL (ref 0–0.2)
BASOPHILS NFR BLD AUTO: 0.1 %
BUN BLD-MCNC: 19 MG/DL (ref 7–18)
BUN/CREAT SERPL: 29.2 (ref 10–20)
CALCIUM BLD-MCNC: 8.2 MG/DL (ref 8.5–10.1)
CHLORIDE SERPL-SCNC: 108 MMOL/L (ref 98–107)
CO2 SERPL-SCNC: 23 MMOL/L (ref 21–32)
CREAT BLD-MCNC: 0.65 MG/DL (ref 0.7–1.3)
DEPRECATED RDW RBC AUTO: 39.5 FL (ref 35.1–46.3)
EOSINOPHIL # BLD AUTO: 0 X10(3) UL (ref 0–0.7)
EOSINOPHIL NFR BLD AUTO: 0 %
ERYTHROCYTE [DISTWIDTH] IN BLOOD BY AUTOMATED COUNT: 12.2 % (ref 11–15)
GLUCOSE BLD-MCNC: 265 MG/DL (ref 70–99)
GLUCOSE BLDC GLUCOMTR-MCNC: 218 MG/DL (ref 70–99)
GLUCOSE BLDC GLUCOMTR-MCNC: 222 MG/DL (ref 70–99)
GLUCOSE BLDC GLUCOMTR-MCNC: 249 MG/DL (ref 70–99)
GLUCOSE BLDC GLUCOMTR-MCNC: 283 MG/DL (ref 70–99)
HAV IGM SER QL: 2 MG/DL (ref 1.6–2.6)
HCT VFR BLD AUTO: 33.5 % (ref 39–53)
HGB BLD-MCNC: 11.7 G/DL (ref 13–17.5)
IMM GRANULOCYTES # BLD AUTO: 0.11 X10(3) UL (ref 0–1)
IMM GRANULOCYTES NFR BLD: 0.7 %
LYMPHOCYTES # BLD AUTO: 1.08 X10(3) UL (ref 1–4)
LYMPHOCYTES NFR BLD AUTO: 7 %
MCH RBC QN AUTO: 30.6 PG (ref 26–34)
MCHC RBC AUTO-ENTMCNC: 34.9 G/DL (ref 31–37)
MCV RBC AUTO: 87.7 FL (ref 80–100)
MONOCYTES # BLD AUTO: 0.26 X10(3) UL (ref 0.1–1)
MONOCYTES NFR BLD AUTO: 1.7 %
NEUTROPHILS # BLD AUTO: 13.98 X10 (3) UL (ref 1.5–7.7)
NEUTROPHILS # BLD AUTO: 13.98 X10(3) UL (ref 1.5–7.7)
NEUTROPHILS NFR BLD AUTO: 90.5 %
OSMOLALITY SERPL CALC.SUM OF ELEC: 300 MOSM/KG (ref 275–295)
PLATELET # BLD AUTO: 243 10(3)UL (ref 150–450)
POTASSIUM SERPL-SCNC: 3.7 MMOL/L (ref 3.5–5.1)
RBC # BLD AUTO: 3.82 X10(6)UL (ref 3.8–5.8)
SODIUM SERPL-SCNC: 139 MMOL/L (ref 136–145)
WBC # BLD AUTO: 15.4 X10(3) UL (ref 4–11)

## 2019-02-27 PROCEDURE — 99233 SBSQ HOSP IP/OBS HIGH 50: CPT | Performed by: HOSPITALIST

## 2019-02-27 PROCEDURE — 71260 CT THORAX DX C+: CPT | Performed by: HOSPITALIST

## 2019-02-27 RX ORDER — POTASSIUM CHLORIDE 14.9 MG/ML
20 INJECTION INTRAVENOUS ONCE
Status: COMPLETED | OUTPATIENT
Start: 2019-02-27 | End: 2019-02-27

## 2019-02-27 RX ORDER — GUAIFENESIN 600 MG
600 TABLET, EXTENDED RELEASE 12 HR ORAL 2 TIMES DAILY
Status: DISCONTINUED | OUTPATIENT
Start: 2019-02-27 | End: 2019-03-02

## 2019-02-27 RX ORDER — LEVOFLOXACIN 5 MG/ML
750 INJECTION, SOLUTION INTRAVENOUS EVERY 24 HOURS
Status: DISCONTINUED | OUTPATIENT
Start: 2019-02-27 | End: 2019-02-28

## 2019-02-27 RX ORDER — AZITHROMYCIN 250 MG/1
500 TABLET, FILM COATED ORAL
Status: DISCONTINUED | OUTPATIENT
Start: 2019-02-27 | End: 2019-02-27

## 2019-02-27 RX ORDER — CLINDAMYCIN PHOSPHATE 600 MG/50ML
600 INJECTION INTRAVENOUS EVERY 8 HOURS
Status: DISCONTINUED | OUTPATIENT
Start: 2019-02-27 | End: 2019-03-02

## 2019-02-27 RX ORDER — IPRATROPIUM BROMIDE AND ALBUTEROL SULFATE 2.5; .5 MG/3ML; MG/3ML
3 SOLUTION RESPIRATORY (INHALATION)
Status: DISCONTINUED | OUTPATIENT
Start: 2019-02-27 | End: 2019-02-28

## 2019-02-27 RX ORDER — SODIUM CHLORIDE 9 MG/ML
INJECTION, SOLUTION INTRAVENOUS
Status: COMPLETED
Start: 2019-02-27 | End: 2019-02-27

## 2019-02-27 RX ORDER — PREDNISONE 20 MG/1
40 TABLET ORAL
Status: DISCONTINUED | OUTPATIENT
Start: 2019-02-27 | End: 2019-02-28

## 2019-02-27 NOTE — PROGRESS NOTES
[facility] Pharmacy Note:  Renal Adjustment for levofloxacin (Thanh Duvall)    Jazmyne Nye is a 80year old male who has been prescribed levofloxacin (LEVAQUIN) 500 mg every 24 hrs.   CrCl is estimated creatinine clearance is 76.3 mL/min (A) (based on SCr of 0.65 mg/d

## 2019-02-27 NOTE — SLP NOTE
ADULT SWALLOWING EVALUATION    ASSESSMENT    ASSESSMENT/OVERALL IMPRESSION:  Orders rec'd, chart reviewed. A swallow evaluation warranted as pt admitted with acute respiratory failure with hypoxia.  Pt without a hx of dysphagia at [address] [place] [address] and no significant CXR • High blood pressure    • High cholesterol    • Hyperlipidemia    • Stroke [facility])      Prior Living Situation: Home with support  Diet Prior to Admission: Unknown  Precautions: Aspiration    Patient/Family Goals: Reduce risk of aspiration    SWALLOWING H liquids/solids, Upright 90 degrees with min assistance 100 % of the time across 2 sessions.      In Progress     FOLLOW UP  Treatment Plan/Recommendations: Aspiration precautions  Number of Visits to Meet Established Goals: 2  Follow Up Needed: Yes  SLP CBS

## 2019-02-27 NOTE — CM/SW NOTE
Sw attempted to contacted son/Nitesh in regards to d/c planning - VM has not been set up. SW to follow up when appropriate.     Michelle Lau, [address]

## 2019-02-27 NOTE — PROGRESS NOTES
[facility] HOSP - [facility]    Progress Note    Jazmyne Nye Patient Status:  Inpatient    4/15/1934 MRN [id number]   Location [facility] 5SW/SE Attending Silver Abraham MD   Hosp Day # 2 PCP Ronnie Urban MD, Shazia Ordonez       Subjective:   Jazmyne Nye is Intravenous Once   • azithromycin  500 mg Oral Daily   • ipratropium-albuterol  3 mL Nebulization Q4H WA (4 times daily)   • guaiFENesin ER  600 mg Oral BID   • insulin detemir  16 Units Subcutaneous Daily   • predniSONE  40 mg Oral Daily with breakfast on 2/25/2019 at 14:38     Approved by (CST): Marlon Damico MD on 2/25/2019 at 14:41              Assessment and Plan:     Acute hypoxemic respiratory failure  - ? Secondary to bronchitis.    - CXR negative  - Still requiring 3.5L oxygen to keep spo2 at

## 2019-02-27 NOTE — PLAN OF CARE
Problem: Patient Centered Care  Goal: Patient preferences are identified and integrated in the patient's plan of care  Interventions:  - What would you like us to know as we care for you?  Lives at [facility]  - Provide timely, complete, and accurate broncho-pulmonary hygiene including cough, deep breathe, Incentive Spirometry  - Assess the need for suctioning and perform as needed  - Assess and instruct to report SOB or any respiratory difficulty  - Respiratory Therapy support as indicated  - Manage/a lowest position.

## 2019-02-28 LAB
ANION GAP SERPL CALC-SCNC: 7 MMOL/L (ref 0–18)
BUN BLD-MCNC: 18 MG/DL (ref 7–18)
BUN/CREAT SERPL: 21.4 (ref 10–20)
CALCIUM BLD-MCNC: 8.7 MG/DL (ref 8.5–10.1)
CHLORIDE SERPL-SCNC: 106 MMOL/L (ref 98–107)
CO2 SERPL-SCNC: 26 MMOL/L (ref 21–32)
CREAT BLD-MCNC: 0.84 MG/DL (ref 0.7–1.3)
DEPRECATED RDW RBC AUTO: 39.6 FL (ref 35.1–46.3)
ERYTHROCYTE [DISTWIDTH] IN BLOOD BY AUTOMATED COUNT: 12.3 % (ref 11–15)
GLUCOSE BLD-MCNC: 159 MG/DL (ref 70–99)
GLUCOSE BLDC GLUCOMTR-MCNC: 106 MG/DL (ref 70–99)
GLUCOSE BLDC GLUCOMTR-MCNC: 127 MG/DL (ref 70–99)
GLUCOSE BLDC GLUCOMTR-MCNC: 143 MG/DL (ref 70–99)
GLUCOSE BLDC GLUCOMTR-MCNC: 88 MG/DL (ref 70–99)
HCT VFR BLD AUTO: 32.5 % (ref 39–53)
HGB BLD-MCNC: 11.3 G/DL (ref 13–17.5)
MCH RBC QN AUTO: 30.4 PG (ref 26–34)
MCHC RBC AUTO-ENTMCNC: 34.8 G/DL (ref 31–37)
MCV RBC AUTO: 87.4 FL (ref 80–100)
OSMOLALITY SERPL CALC.SUM OF ELEC: 293 MOSM/KG (ref 275–295)
PLATELET # BLD AUTO: 232 10(3)UL (ref 150–450)
POTASSIUM SERPL-SCNC: 3.6 MMOL/L (ref 3.5–5.1)
POTASSIUM SERPL-SCNC: 3.6 MMOL/L (ref 3.5–5.1)
RBC # BLD AUTO: 3.72 X10(6)UL (ref 3.8–5.8)
SODIUM SERPL-SCNC: 139 MMOL/L (ref 136–145)
WBC # BLD AUTO: 13.7 X10(3) UL (ref 4–11)

## 2019-02-28 PROCEDURE — 99222 1ST HOSP IP/OBS MODERATE 55: CPT | Performed by: INTERNAL MEDICINE

## 2019-02-28 PROCEDURE — 99233 SBSQ HOSP IP/OBS HIGH 50: CPT | Performed by: HOSPITALIST

## 2019-02-28 RX ORDER — IPRATROPIUM BROMIDE AND ALBUTEROL SULFATE 2.5; .5 MG/3ML; MG/3ML
3 SOLUTION RESPIRATORY (INHALATION)
Status: DISCONTINUED | OUTPATIENT
Start: 2019-02-28 | End: 2019-02-28

## 2019-02-28 RX ORDER — POTASSIUM CHLORIDE 20 MEQ/1
40 TABLET, EXTENDED RELEASE ORAL EVERY 4 HOURS
Status: DISPENSED | OUTPATIENT
Start: 2019-02-28 | End: 2019-02-28

## 2019-02-28 RX ORDER — LEVOFLOXACIN 750 MG/1
750 TABLET ORAL DAILY
Status: DISCONTINUED | OUTPATIENT
Start: 2019-02-28 | End: 2019-03-02

## 2019-02-28 RX ORDER — IPRATROPIUM BROMIDE AND ALBUTEROL SULFATE 2.5; .5 MG/3ML; MG/3ML
3 SOLUTION RESPIRATORY (INHALATION)
Status: DISCONTINUED | OUTPATIENT
Start: 2019-02-28 | End: 2019-03-02

## 2019-02-28 NOTE — CONSULTS
[facility]    Report of Consultation    Akhil Collier Patient Status:  Inpatient    4/15/1934 MRN [id number]   Location [facility] 5SW/SE Attending Jordan Begum MD   Hosp Day # 3 PCP Blaine Duggan MD, Maurice Hay     Date of Admission:   clindamycin in D5W (CLEOCIN) premix 600mg/50ml 600 mg Intravenous Q8H   ondansetron HCl (ZOFRAN) injection 4 mg 4 mg Intravenous Q4H PRN   Normal Saline Flush 0.9 % injection 3 mL 3 mL Intravenous PRN   Heparin Sodium (Porcine) 5000 UNIT/ML injection 5,0 glipiZIDE (GLUCOTROL) 10 MG Oral Tab Take 10 mg by mouth daily. Allergies  No Known Allergies    Review of Systems:   See above      Physical Exam:   Blood pressure 132/63, pulse 96, temperature 97.5 °F (36.4 °C), temperature source Oral, resp.  r 2/27/2019 at 9:27            Assessment   1. Acute hypoxemic respiratory failure  2. Bibasilar pneumonia  3. Dementia  4. Diabetes mellitus  5. Hilar lymphadenopathy  6. Emphysema    Plan   -Patient presents with evidence of cough, malaise.   -CT ches

## 2019-02-28 NOTE — CM/SW NOTE
EH contacted son/Nitesh in regards to discharge planning. Per son, pt lives at [facility] (assisted living) alone. Son stated pt is always in a wheelchair and gets assistance from staff at [facility]. Eh discussed plans for at discharge.  Per

## 2019-02-28 NOTE — SLP NOTE
SPEECH DAILY NOTE - INPATIENT    ASSESSMENT & PLAN   ASSESSMENT  Patient seen to monitor tolerance of PO diet. RN reported patient was coughing with breakfast.  Chest CT taken yesterday revealed airspace consolidation R>L, suspicious for pneumonia.  Zuleyma position; Slow rate;Small bites and sips; No straw  Medication Administration Recommendations: Whole in puree                     Discharge Recommendations  Discharge Recommendations/Plan: Undetermined    Treatment Plan  Treatment Plan/Recommendations: Aspir

## 2019-02-28 NOTE — PROGRESS NOTES
[facility] HOSP - [facility]    Progress Note    Tarry Flatter Patient Status:  Inpatient    4/15/1934 MRN [id number]   Location [facility] 5SW/SE Attending Barbara Hernandez MD   Hosp Day # 3 PCP Chuy Jernigan MD, Yaniv Arce       Subjective:   Tarry Flatter  - Oral Daily   • [START ON 3/1/2019] predniSONE  30 mg Oral Daily with breakfast   • ipratropium-albuterol  3 mL Nebulization 2 times daily   • guaiFENesin ER  600 mg Oral BID   • insulin detemir  16 Units Subcutaneous Daily   • Insulin Aspart Pen  10 Units lobes, suspicious for bibasilar pneumonia. Aspiration is in the differential given configuration. Posttreatment follow-up in 3 months is recommended to ensure resolution. 3. Basilar predominant peribronchial thickening, suggesting coexistent bronchitis.

## 2019-02-28 NOTE — PLAN OF CARE
Problem: Patient Centered Care  Goal: Patient preferences are identified and integrated in the patient's plan of care  Interventions:  - What would you like us to know as we care for you?  Lives at [facility]  - Provide timely, complete, and accurate broncho-pulmonary hygiene including cough, deep breathe, Incentive Spirometry  - Assess the need for suctioning and perform as needed  - Assess and instruct to report SOB or any respiratory difficulty  - Respiratory Therapy support as indicated  - Manage/a

## 2019-03-01 ENCOUNTER — APPOINTMENT (OUTPATIENT)
Dept: GENERAL RADIOLOGY | Facility: HOSPITAL | Age: 84
DRG: 193 | End: 2019-03-01
Attending: HOSPITALIST
Payer: MEDICARE

## 2019-03-01 LAB
ANION GAP SERPL CALC-SCNC: 8 MMOL/L (ref 0–18)
BUN BLD-MCNC: 16 MG/DL (ref 7–18)
BUN/CREAT SERPL: 23.5 (ref 10–20)
CALCIUM BLD-MCNC: 8.5 MG/DL (ref 8.5–10.1)
CHLORIDE SERPL-SCNC: 105 MMOL/L (ref 98–107)
CO2 SERPL-SCNC: 24 MMOL/L (ref 21–32)
CREAT BLD-MCNC: 0.68 MG/DL (ref 0.7–1.3)
DEPRECATED RDW RBC AUTO: 40 FL (ref 35.1–46.3)
ERYTHROCYTE [DISTWIDTH] IN BLOOD BY AUTOMATED COUNT: 12.6 % (ref 11–15)
GLUCOSE BLD-MCNC: 96 MG/DL (ref 70–99)
GLUCOSE BLDC GLUCOMTR-MCNC: 174 MG/DL (ref 70–99)
GLUCOSE BLDC GLUCOMTR-MCNC: 191 MG/DL (ref 70–99)
GLUCOSE BLDC GLUCOMTR-MCNC: 302 MG/DL (ref 70–99)
GLUCOSE BLDC GLUCOMTR-MCNC: 305 MG/DL (ref 70–99)
GLUCOSE BLDC GLUCOMTR-MCNC: 321 MG/DL (ref 70–99)
HCT VFR BLD AUTO: 34.9 % (ref 39–53)
HGB BLD-MCNC: 12 G/DL (ref 13–17.5)
MCH RBC QN AUTO: 30 PG (ref 26–34)
MCHC RBC AUTO-ENTMCNC: 34.4 G/DL (ref 31–37)
MCV RBC AUTO: 87.3 FL (ref 80–100)
OSMOLALITY SERPL CALC.SUM OF ELEC: 285 MOSM/KG (ref 275–295)
PLATELET # BLD AUTO: 215 10(3)UL (ref 150–450)
POTASSIUM SERPL-SCNC: 3.5 MMOL/L (ref 3.5–5.1)
POTASSIUM SERPL-SCNC: 3.5 MMOL/L (ref 3.5–5.1)
POTASSIUM SERPL-SCNC: 4.4 MMOL/L (ref 3.5–5.1)
RBC # BLD AUTO: 4 X10(6)UL (ref 3.8–5.8)
SODIUM SERPL-SCNC: 137 MMOL/L (ref 136–145)
WBC # BLD AUTO: 8.9 X10(3) UL (ref 4–11)

## 2019-03-01 PROCEDURE — 71046 X-RAY EXAM CHEST 2 VIEWS: CPT | Performed by: HOSPITALIST

## 2019-03-01 PROCEDURE — 74230 X-RAY XM SWLNG FUNCJ C+: CPT | Performed by: HOSPITALIST

## 2019-03-01 PROCEDURE — 99232 SBSQ HOSP IP/OBS MODERATE 35: CPT | Performed by: INTERNAL MEDICINE

## 2019-03-01 PROCEDURE — 99233 SBSQ HOSP IP/OBS HIGH 50: CPT | Performed by: HOSPITALIST

## 2019-03-01 RX ORDER — POTASSIUM CHLORIDE 20 MEQ/1
40 TABLET, EXTENDED RELEASE ORAL EVERY 4 HOURS
Status: COMPLETED | OUTPATIENT
Start: 2019-03-01 | End: 2019-03-01

## 2019-03-01 NOTE — PLAN OF CARE
Problem: Patient Centered Care  Goal: Patient preferences are identified and integrated in the patient's plan of care  Interventions:  - What would you like us to know as we care for you?  Lives at [facility]  - Provide timely, complete, and accurate broncho-pulmonary hygiene including cough, deep breathe, Incentive Spirometry  - Assess the need for suctioning and perform as needed  - Assess and instruct to report SOB or any respiratory difficulty  - Respiratory Therapy support as indicated  - Manage/a

## 2019-03-01 NOTE — PROGRESS NOTES
[facility]     Progress Note        Herminia Carmona Patient Status:  Inpatient    4/15/1934 MRN [id number]   Location [facility] 5SW/SE Attending Ruslan Vail MD   Hosp Day # 4 PCP Zach Castro MD, Jackie Marquez       Subjective:   Patient se mL 3 mL Nebulization Q6H PRN   dextrose 50 % injection 50 mL 50 mL Intravenous PRN   Glucose-Vitamin C (DEX-4) 4-6 GM-MG chewable tab 4 tablet 4 tablet Oral Q15 Min PRN   glucose (DEX4) oral liquid 15 g 15 g Oral Q15 Min PRN   amLODIPine Besylate (NORVASC) Soha Chisholm, [address] [facility] [place]

## 2019-03-01 NOTE — PROGRESS NOTES
[facility]    Progress Note    Isabelle Nina Patient Status:  Inpatient    4/15/1934 MRN [id number]   Location [facility] 5SW/SE Attending Claudette Harsh, MD   Hosp Day # 4 PCP Jd Jain MD, Rah Bloom       Subjective:   Isabelle Nina  Is Aspart Pen  8 Units Subcutaneous TID CC   • levofloxacin  750 mg Oral Daily   • predniSONE  30 mg Oral Daily with breakfast   • ipratropium-albuterol  3 mL Nebulization Q4H WA (4 times daily)   • guaiFENesin ER  600 mg Oral BID   • Insulin Aspart Pen  1-7 from February 25, 2019. 2. Atherosclerosis. 3. Scarring/atelectasis. 4. Demineralization. 5. Scoliosis. 6. Osteoarthritis. Dictated by (CST): Minal Armenta MD on 3/01/2019 at 11:50     Approved by (CST):  Minal Armenta MD on 3/01/2019 at 11:52

## 2019-03-01 NOTE — SLP NOTE
ADULT VIDEOFLUOROSCOPIC SWALLOWING STUDY    Admission Date: 2/25/2019  Evaluation Date: 03/01/19  Radiologist: Dr. Alvarez Chelle: Mechanical soft chopped(extra sauces/gravies )  Diet Recommendations - Liquid: granulomatous disease. 7. Coronary atherosclerosis. 8. Small hiatal hernia. 9. Approximate 1 cm hepatic lipoma. 10. Lesser incidental findings as above. Reason for Referral: R/O aspiration    Family/Patient Goals:   Tolerate least restrictive di - Puree): Intact  Bilabial Seal (VFSS - Puree): Intact  Bolus Formation (VFSS - Puree): Impaired  Bolus Propulsion (VFSS - Puree):  Impaired  Triggered at: Base of tongue  Residue Severity, Location: Mild/Mod;Valleculae  Cleared/Reduced with: Secondary swal sauces/gravies and thin liquids via cup with 1:1 supervision. Consider downgrade to nectar thickened liquids if CXR does not improve and/or overt CSA at bedside. Results and recommendations reviewed with the patient and the RN.       FCM Score: 6   FCM Impr

## 2019-03-01 NOTE — PLAN OF CARE
Problem: Patient Centered Care  Goal: Patient preferences are identified and integrated in the patient's plan of care  Interventions:  - What would you like us to know as we care for you?  Lives at [facility]  - Provide timely, complete, and accurate applicable  - Encourage broncho-pulmonary hygiene including cough, deep breathe, Incentive Spirometry  - Assess the need for suctioning and perform as needed  - Assess and instruct to report SOB or any respiratory difficulty  - Respiratory Therapy support

## 2019-03-02 VITALS
OXYGEN SATURATION: 93 % | BODY MASS INDEX: 24.11 KG/M2 | RESPIRATION RATE: 22 BRPM | HEART RATE: 75 BPM | DIASTOLIC BLOOD PRESSURE: 58 MMHG | TEMPERATURE: 97 F | HEIGHT: 66 IN | SYSTOLIC BLOOD PRESSURE: 135 MMHG | WEIGHT: 150 LBS

## 2019-03-02 LAB
ANION GAP SERPL CALC-SCNC: 7 MMOL/L (ref 0–18)
BUN BLD-MCNC: 16 MG/DL (ref 7–18)
BUN/CREAT SERPL: 19 (ref 10–20)
CALCIUM BLD-MCNC: 8.6 MG/DL (ref 8.5–10.1)
CHLORIDE SERPL-SCNC: 106 MMOL/L (ref 98–107)
CO2 SERPL-SCNC: 25 MMOL/L (ref 21–32)
CREAT BLD-MCNC: 0.84 MG/DL (ref 0.7–1.3)
DEPRECATED RDW RBC AUTO: 41.6 FL (ref 35.1–46.3)
ERYTHROCYTE [DISTWIDTH] IN BLOOD BY AUTOMATED COUNT: 12.7 % (ref 11–15)
GLUCOSE BLD-MCNC: 147 MG/DL (ref 70–99)
GLUCOSE BLDC GLUCOMTR-MCNC: 156 MG/DL (ref 70–99)
GLUCOSE BLDC GLUCOMTR-MCNC: 344 MG/DL (ref 70–99)
HCT VFR BLD AUTO: 37.5 % (ref 39–53)
HGB BLD-MCNC: 12.3 G/DL (ref 13–17.5)
MCH RBC QN AUTO: 29.6 PG (ref 26–34)
MCHC RBC AUTO-ENTMCNC: 32.8 G/DL (ref 31–37)
MCV RBC AUTO: 90.1 FL (ref 80–100)
OSMOLALITY SERPL CALC.SUM OF ELEC: 290 MOSM/KG (ref 275–295)
PLATELET # BLD AUTO: 208 10(3)UL (ref 150–450)
POTASSIUM SERPL-SCNC: 3.9 MMOL/L (ref 3.5–5.1)
RBC # BLD AUTO: 4.16 X10(6)UL (ref 3.8–5.8)
SODIUM SERPL-SCNC: 138 MMOL/L (ref 136–145)
WBC # BLD AUTO: 10.9 X10(3) UL (ref 4–11)

## 2019-03-02 PROCEDURE — 99232 SBSQ HOSP IP/OBS MODERATE 35: CPT | Performed by: INTERNAL MEDICINE

## 2019-03-02 PROCEDURE — 99239 HOSP IP/OBS DSCHRG MGMT >30: CPT | Performed by: HOSPITALIST

## 2019-03-02 RX ORDER — CLINDAMYCIN HYDROCHLORIDE 300 MG/1
300 CAPSULE ORAL 3 TIMES DAILY
Qty: 18 CAPSULE | Refills: 0 | Status: SHIPPED | OUTPATIENT
Start: 2019-03-02 | End: 2019-03-08

## 2019-03-02 RX ORDER — PREDNISONE 10 MG/1
TABLET ORAL
Qty: 6 TABLET | Refills: 0 | Status: SHIPPED | OUTPATIENT
Start: 2019-03-02

## 2019-03-02 RX ORDER — GUAIFENESIN 600 MG
600 TABLET, EXTENDED RELEASE 12 HR ORAL 2 TIMES DAILY
Qty: 30 TABLET | Refills: 0 | Status: SHIPPED | OUTPATIENT
Start: 2019-03-02

## 2019-03-02 RX ORDER — LEVOFLOXACIN 750 MG/1
750 TABLET ORAL DAILY
Qty: 6 TABLET | Refills: 0 | Status: SHIPPED | OUTPATIENT
Start: 2019-03-03 | End: 2019-03-09

## 2019-03-02 NOTE — SLP NOTE
SPEECH DAILY NOTE - INPATIENT    ASSESSMENT & PLAN   ASSESSMENT  Pt seen sitting upright in bed for all PO trials and follow-up per VFSS results and recommendations. Pt with good oral acceptance and bilabial seal across all trials.  No anterior loss of bolu signs or symptoms of aspiration with 100 % accuracy over 2 session(s). Goal updated 3/2.    Goal #2 The patient will utilize compensatory strategies as outlined by  BSSE (clinical evaluation) including Slow rate, Small bites, Small sips, Multiple swallows,

## 2019-03-02 NOTE — CM/SW NOTE
Orders received for discharge to [facility] ambulance notified by [phone] for transport at 4pm    Patient on 2 liter oxygen    Rn to Rn report [phone]    ALEXANDRE and Dr Catalina Hernandez spoke with son    PCS form on chart    Dat Mahmood

## 2019-03-02 NOTE — PLAN OF CARE
Problem: Patient Centered Care  Goal: Patient preferences are identified and integrated in the patient's plan of care  Interventions:  - What would you like us to know as we care for you?  Lives at [facility]  - Provide timely, complete, and accurate broncho-pulmonary hygiene including cough, deep breathe, Incentive Spirometry  - Assess the need for suctioning and perform as needed  - Assess and instruct to report SOB or any respiratory difficulty  - Respiratory Therapy support as indicated  - Manage/a

## 2019-03-02 NOTE — DISCHARGE SUMMARY
[address] Hospitalist Discharge Summary   Patient ID:  Tavares Mchugh  [id number]  68 year old  4/15/1934    Admit date: 2/25/2019  Discharge date: 3/2/2019  Primary Care Physician: Radha Rudolph MD, Trish Marshall   Attending Physician: Neal Reagan MD   Consults:   Catalina Jc home meds                 EXAM:   GENERAL: no apparent distress, comfortable  NEURO: A/A Ox3, no focal deficits  RESP: non labored, CTAB/L  CARDIO: Regular, no murmur  ABD: soft, NT, ND  EXTREMITIES: no edema, no calf tenderness    Operative Procedures: HYDRODIURIL     metoprolol Tartrate 25 MG Tabs  Commonly known as:  LOPRESSOR     omega-3 fatty acids 1000 MG Caps  Commonly known as:  FISH OIL     Pentoxifylline  MG Tbcr  Commonly known as:  TRENTAL     Potassium Citrate ER 10 MEQ (1080 MG) Tbcr

## 2019-03-02 NOTE — PROGRESS NOTES
[facility]    Progress Note      Assessment and Plan:   1. Mild COPD exacerbation with possible pneumonia–there was concern regarding underlying pneumonia. The x-ray looks quite good. His lungs sound fairly clear.   He is okay to go back

## 2019-03-02 NOTE — PLAN OF CARE
CONFUSION    • Confusion, delirium, dementia or psychosis is improved or at baseline Adequate for Discharge        Diabetes/Glucose Control    • Glucose maintained within prescribed range Adequate for Discharge        Patient Centered Care    • Patient pre

## 2019-03-04 PROCEDURE — 99305 1ST NF CARE MODERATE MDM 35: CPT | Performed by: INTERNAL MEDICINE

## 2019-03-05 ENCOUNTER — INITIAL APN SNF VISIT (OUTPATIENT)
Dept: INTERNAL MEDICINE CLINIC | Facility: SKILLED NURSING FACILITY | Age: 84
End: 2019-03-05

## 2019-03-05 ENCOUNTER — SNF/IP PROF CHARGE ONLY (OUTPATIENT)
Dept: INTERNAL MEDICINE CLINIC | Facility: CLINIC | Age: 84
End: 2019-03-05

## 2019-03-05 VITALS
BODY MASS INDEX: 23 KG/M2 | DIASTOLIC BLOOD PRESSURE: 76 MMHG | SYSTOLIC BLOOD PRESSURE: 132 MMHG | OXYGEN SATURATION: 98 % | TEMPERATURE: 98 F | WEIGHT: 141.81 LBS | RESPIRATION RATE: 20 BRPM | HEART RATE: 78 BPM

## 2019-03-05 DIAGNOSIS — J96.01 ACUTE RESPIRATORY FAILURE WITH HYPOXIA (HCC): ICD-10-CM

## 2019-03-05 DIAGNOSIS — N39.0 URINARY TRACT INFECTION WITHOUT HEMATURIA, SITE UNSPECIFIED: ICD-10-CM

## 2019-03-05 DIAGNOSIS — R26.89 MULTIFACTORIAL GAIT DISORDER: ICD-10-CM

## 2019-03-05 DIAGNOSIS — R53.1 WEAKNESS GENERALIZED: ICD-10-CM

## 2019-03-05 PROCEDURE — 99310 SBSQ NF CARE HIGH MDM 45: CPT | Performed by: NURSE PRACTITIONER

## 2019-03-05 NOTE — PROGRESS NOTES
Jeronimo Galan  : 4/15/1934  Age 80year old  male patient is admitted to [place] [address] 3/2/19 for rehab and strengthening     Chief complaint: Acute respiratory failure with hypoxia, pneumonia, generalized weakness , nausea/vomiting      EMH Admission : 2 cholesterol    • Hyperlipidemia    • Stroke [facility])      Past Surgical History:   Procedure Laterality Date   • HERNIA SURGERY Bilateral      No family history on file.   Social History    Tobacco Use      Smoking status: Never Smoker      Smokeless tobacco: VITALS:  /76   Pulse 78   Temp 98 °F (36.7 °C)   Resp 20   Wt 141 lb 12.8 oz (64.3 kg)   SpO2 98%   BMI 22.89 kg/m²      REVIEW OF SYSTEMS:  GENERAL HEALTH:feels well otherwise  SKIN: denies any unusual skin lesions or rashes  WOUNDS: none   EY weakness  EXTREMITIES/VASCULAR:no cyanosis, clubbing or edema  NEUROLOGIC: intact; no sensorimotor deficit  PSYCHIATRIC: alert and oriented x 2 - knows self; states that rehab facility is his home; unsure about date/time      MEDICAL DECISION MAKING  unsur of the time was spent counseling the patient and/or coordinating care.     Christi Miner, ADRIAN  03/05/19   10:38 AM

## 2019-03-06 ENCOUNTER — SNF VISIT (OUTPATIENT)
Dept: INTERNAL MEDICINE CLINIC | Facility: SKILLED NURSING FACILITY | Age: 84
End: 2019-03-06

## 2019-03-06 VITALS
OXYGEN SATURATION: 97 % | SYSTOLIC BLOOD PRESSURE: 160 MMHG | TEMPERATURE: 98 F | DIASTOLIC BLOOD PRESSURE: 72 MMHG | WEIGHT: 141.81 LBS | RESPIRATION RATE: 20 BRPM | HEART RATE: 72 BPM | BODY MASS INDEX: 23 KG/M2

## 2019-03-06 DIAGNOSIS — R53.1 WEAKNESS GENERALIZED: ICD-10-CM

## 2019-03-06 DIAGNOSIS — R26.89 MULTIFACTORIAL GAIT DISORDER: ICD-10-CM

## 2019-03-06 DIAGNOSIS — J96.01 ACUTE RESPIRATORY FAILURE WITH HYPOXIA (HCC): ICD-10-CM

## 2019-03-06 DIAGNOSIS — N39.0 URINARY TRACT INFECTION WITHOUT HEMATURIA, SITE UNSPECIFIED: ICD-10-CM

## 2019-03-06 PROCEDURE — 1123F ACP DISCUSS/DSCN MKR DOCD: CPT | Performed by: NURSE PRACTITIONER

## 2019-03-06 PROCEDURE — 99308 SBSQ NF CARE LOW MDM 20: CPT | Performed by: NURSE PRACTITIONER

## 2019-03-06 NOTE — PROGRESS NOTES
Joe Eastern  : 4/15/1934  Age 80year old  male patient is admitted to [facility] 3/2/19 for rehab and strengthening     Admitted to [facility] AND [facility] on: 19 to 3/2/19    Chief complaint: Acute respiratory failure with hypoxia, pneumonia, genera  MG Oral Tablet 12 Hr Take 1 tablet (600 mg total) by mouth 2 (two) times daily. Disp: 30 tablet Rfl: 0   predniSONE 10 MG Oral Tab Take 2 tabs Po daily x 2 days. Take 1 tab PO x 2 days then stop.  Disp: 6 tablet Rfl: 0   levofloxacin 750 MG Oral Tab today  :no dysuria, urgency or frequency; no epididymal or testicular pain; no penile discharge; no hernias; no nocturia: no hematuria  MUSCULOSKELETAL:no joint complaints upper or lower extremities  NEURO:no sensory or motor complaint  PSYCHE: no sympto duonebs qshift and q6h PRN due to slight cough and diminished breath sounds  - Continue predisone taper, clindamycin 300 mg TID, levaquin 750 mg QD; total 9 days treatment  - Guaifenesin 600 mg q12h  - CBC/BMP weekly, 2/7/19 cbc : wbc 8.43, RBC 4.26, HGB 1

## 2019-03-07 ENCOUNTER — SNF/IP PROF CHARGE ONLY (OUTPATIENT)
Dept: INTERNAL MEDICINE CLINIC | Facility: CLINIC | Age: 84
End: 2019-03-07

## 2019-03-07 DIAGNOSIS — R53.1 WEAKNESS GENERALIZED: ICD-10-CM

## 2019-03-07 DIAGNOSIS — J96.01 ACUTE RESPIRATORY FAILURE WITH HYPOXIA (HCC): ICD-10-CM

## 2019-03-07 DIAGNOSIS — R26.89 MULTIFACTORIAL GAIT DISORDER: ICD-10-CM

## 2019-03-07 PROCEDURE — 99308 SBSQ NF CARE LOW MDM 20: CPT | Performed by: INTERNAL MEDICINE

## 2019-03-08 ENCOUNTER — SNF VISIT (OUTPATIENT)
Dept: INTERNAL MEDICINE CLINIC | Facility: SKILLED NURSING FACILITY | Age: 84
End: 2019-03-08

## 2019-03-08 VITALS
OXYGEN SATURATION: 96 % | TEMPERATURE: 98 F | RESPIRATION RATE: 18 BRPM | WEIGHT: 141.81 LBS | BODY MASS INDEX: 23 KG/M2 | SYSTOLIC BLOOD PRESSURE: 151 MMHG | DIASTOLIC BLOOD PRESSURE: 68 MMHG | HEART RATE: 88 BPM

## 2019-03-08 DIAGNOSIS — R53.1 WEAKNESS GENERALIZED: ICD-10-CM

## 2019-03-08 DIAGNOSIS — N39.0 URINARY TRACT INFECTION WITHOUT HEMATURIA, SITE UNSPECIFIED: ICD-10-CM

## 2019-03-08 DIAGNOSIS — J40 BRONCHITIS: ICD-10-CM

## 2019-03-08 PROCEDURE — 99310 SBSQ NF CARE HIGH MDM 45: CPT | Performed by: NURSE PRACTITIONER

## 2019-03-08 NOTE — PROGRESS NOTES
Shruthi Tolentino  : 4/15/1934  Age 80year old  male patient is admitted to [place] [address] for rehabilitation and strengthening       Chief complaint: Acute respiratory failure with hypoxia, pneumonia, generalized weakness , nausea/vomiting      HPI   Admitte normal nose, no nasal drainage, mucous membranes pink, moist, pharynx no exudate, no visible cerumen.   NECK: supple; FROM; no JVD, no TMG, no carotid bruits  BREAST: no masses, no nipple discharge, no nodes; normal skin  RESPIRATORY: bilateral lungs sounds today   - accuchecks bid  - cont glipizide 10 mg QD  - cont metformin 500mg po daily   - on metformin 1000 mg BID while on steroids x 4 days   - monitor sugars with steroids being tapered.      HTN  - monitor vitals, slightly elevated, will monitor, may ne

## 2019-03-11 ENCOUNTER — SNF VISIT (OUTPATIENT)
Dept: INTERNAL MEDICINE CLINIC | Facility: SKILLED NURSING FACILITY | Age: 84
End: 2019-03-11

## 2019-03-11 VITALS
RESPIRATION RATE: 20 BRPM | OXYGEN SATURATION: 98 % | BODY MASS INDEX: 21 KG/M2 | DIASTOLIC BLOOD PRESSURE: 72 MMHG | TEMPERATURE: 98 F | SYSTOLIC BLOOD PRESSURE: 128 MMHG | WEIGHT: 129.19 LBS | HEART RATE: 76 BPM

## 2019-03-11 DIAGNOSIS — J96.01 ACUTE RESPIRATORY FAILURE WITH HYPOXIA (HCC): ICD-10-CM

## 2019-03-11 DIAGNOSIS — J40 BRONCHITIS: ICD-10-CM

## 2019-03-11 DIAGNOSIS — R53.1 WEAKNESS GENERALIZED: ICD-10-CM

## 2019-03-11 PROCEDURE — 99309 SBSQ NF CARE MODERATE MDM 30: CPT | Performed by: NURSE PRACTITIONER

## 2019-03-11 NOTE — H&P
[facility]    History and Physical    Jayde Pena Patient Status:  No patient class for patient encounter    4/15/1934 MRN [id number]   Location [facility] Attending No att. providers found   Hosp Day # 0 PCP Kody Tavarez MD, Farida Zhao

## 2019-03-11 NOTE — PROGRESS NOTES
Wenatchee Bone  : 4/15/1934  Age 80year old  male patient is admitted to [place] [address] on 3/2/19 for rehab and strengthening.     Admitted to [facility] AND [facility] on 19 to 3/2/19    Chief complaint: Acute respiratory failure with hypoxia, pneumonia, gene file.  Social History    Tobacco Use      Smoking status: Never Smoker      Smokeless tobacco: Never Used    Alcohol use: No    Drug use: No      ALLERGIES:  No Known Allergies    CODE STATUS:  DNR    ADVANCED CARE PLANNING TEAM: will need family care plan after drinking thickened liquids.   CARDIOVASCULAR:denies chest pain, no palpitations , denies syncope, denies orthopnea  GI: denies nausea, vomiting, constipation, diarrhea; no rectal bleeding; no heartburn  :no dysuria, urgency or frequency; no epididym PNA. Seen on CT chest in hospital    - CXR negative in hospital   - CT chest shows Bibasilar PNA cannot r/o aspiration.  No PE.    - respiratory panel neg in hospital   - weaned off O2 and saturating 98% on room air  - cont duonebs qshift and q6h PRN due to

## 2019-03-12 PROCEDURE — 99308 SBSQ NF CARE LOW MDM 20: CPT | Performed by: INTERNAL MEDICINE

## 2019-03-13 ENCOUNTER — SNF VISIT (OUTPATIENT)
Dept: INTERNAL MEDICINE CLINIC | Facility: SKILLED NURSING FACILITY | Age: 84
End: 2019-03-13

## 2019-03-13 VITALS
RESPIRATION RATE: 20 BRPM | SYSTOLIC BLOOD PRESSURE: 112 MMHG | OXYGEN SATURATION: 98 % | DIASTOLIC BLOOD PRESSURE: 61 MMHG | BODY MASS INDEX: 21 KG/M2 | WEIGHT: 129.19 LBS | HEART RATE: 79 BPM | TEMPERATURE: 98 F

## 2019-03-13 DIAGNOSIS — N39.0 URINARY TRACT INFECTION WITHOUT HEMATURIA, SITE UNSPECIFIED: ICD-10-CM

## 2019-03-13 DIAGNOSIS — J40 BRONCHITIS: ICD-10-CM

## 2019-03-13 DIAGNOSIS — R26.89 MULTIFACTORIAL GAIT DISORDER: ICD-10-CM

## 2019-03-13 DIAGNOSIS — R53.1 WEAKNESS GENERALIZED: ICD-10-CM

## 2019-03-13 PROCEDURE — 99308 SBSQ NF CARE LOW MDM 20: CPT | Performed by: NURSE PRACTITIONER

## 2019-03-13 NOTE — PROGRESS NOTES
Rakesh Jang  : 4/15/1934  Age 80year old  male patient is admitted to [place] [address] on 3/2/19 for rehab and strengthening.     Admitted to [facility] CLINICS on: 19 to 3/2/19    Chief complaint:Acute respiratory failure with hypoxia, pneumonia, gene updated    Current Outpatient Medications:  guaiFENesin  MG Oral Tablet 12 Hr Take 1 tablet (600 mg total) by mouth 2 (two) times daily. Disp: 30 tablet Rfl: 0   predniSONE 10 MG Oral Tab Take 2 tabs Po daily x 2 days.  Take 1 tab PO x 2 days then sto hernias; no nocturia: no hematuria  MUSCULOSKELETAL:no joint complaints upper or lower extremities  NEURO:no sensory or motor complaint  PSYCHE: no symptoms of depression or anxiety  HEMATOLOGY:denies bruising, denies excessive bleeding  ENDOCRINE: denies clindamycin 300 mg TID, levaquin 750 mg QD; total 9 days treatment - course completed  - Guaifenesin 600 mg q12h  - CBC/BMP weekly, ordered for 3/12/19     2.  Bibasilar PNA  - clindamycin and levaquin - completed  - Speech swallow study rec regular solids

## 2019-03-14 PROCEDURE — 99308 SBSQ NF CARE LOW MDM 20: CPT | Performed by: INTERNAL MEDICINE

## 2019-03-15 ENCOUNTER — SNF VISIT (OUTPATIENT)
Dept: INTERNAL MEDICINE CLINIC | Facility: SKILLED NURSING FACILITY | Age: 84
End: 2019-03-15

## 2019-03-15 ENCOUNTER — SNF/IP PROF CHARGE ONLY (OUTPATIENT)
Dept: INTERNAL MEDICINE CLINIC | Facility: CLINIC | Age: 84
End: 2019-03-15

## 2019-03-15 VITALS
SYSTOLIC BLOOD PRESSURE: 158 MMHG | HEART RATE: 76 BPM | RESPIRATION RATE: 20 BRPM | DIASTOLIC BLOOD PRESSURE: 73 MMHG | WEIGHT: 129.31 LBS | TEMPERATURE: 98 F | OXYGEN SATURATION: 98 % | BODY MASS INDEX: 21 KG/M2

## 2019-03-15 DIAGNOSIS — J96.01 ACUTE RESPIRATORY FAILURE WITH HYPOXIA (HCC): ICD-10-CM

## 2019-03-15 DIAGNOSIS — R53.1 WEAKNESS GENERALIZED: ICD-10-CM

## 2019-03-15 DIAGNOSIS — R26.89 MULTIFACTORIAL GAIT DISORDER: ICD-10-CM

## 2019-03-15 DIAGNOSIS — J40 BRONCHITIS: ICD-10-CM

## 2019-03-15 DIAGNOSIS — N39.0 URINARY TRACT INFECTION WITHOUT HEMATURIA, SITE UNSPECIFIED: ICD-10-CM

## 2019-03-15 DIAGNOSIS — Z91.89 AT RISK FOR ASPIRATION: ICD-10-CM

## 2019-03-15 PROCEDURE — 99308 SBSQ NF CARE LOW MDM 20: CPT | Performed by: NURSE PRACTITIONER

## 2019-03-15 NOTE — PROGRESS NOTES
Anushka Dubon  : 4/15/1934  Age 80year old  male patient is admitted to [place] [address] on 3/2/19 for rehab and strengthening.     Admitted to [facility] CLINICS on: 19 to 3/2/19     Chief complaint:Acute respiratory failure with hypoxia, pneumonia, ge CARE PLANNING TEAM: will need family care plan, discharge possibly 3/27/19           CURRENT MEDICATIONS -reviewed and updated     Current Outpatient Medications:  guaiFENesin  MG Oral Tablet 12 Hr Take 1 tablet (600 mg total) by mouth 2 (two) times diarrhea; no rectal bleeding; no heartburn  :no dysuria, urgency or frequency; no epididymal or testicular pain; no penile discharge; no hernias; no nocturia: no hematuria  MUSCULOSKELETAL:no joint complaints upper or lower extremities  NEURO:no sensory hospital   - weaned off O2 and saturating 98% on room air  - cont duonebs qshift and q6h PRN due to slight cough  - Completed  predisone taper, clindamycin 300 mg TID, levaquin 750 mg QD; total 9 days treatment  - Guaifenesin 600 mg q12h  - CBC/BMP weekly

## 2019-03-18 ENCOUNTER — SNF VISIT (OUTPATIENT)
Dept: INTERNAL MEDICINE CLINIC | Facility: SKILLED NURSING FACILITY | Age: 84
End: 2019-03-18

## 2019-03-18 VITALS
HEART RATE: 60 BPM | OXYGEN SATURATION: 97 % | TEMPERATURE: 98 F | RESPIRATION RATE: 20 BRPM | DIASTOLIC BLOOD PRESSURE: 64 MMHG | BODY MASS INDEX: 21 KG/M2 | SYSTOLIC BLOOD PRESSURE: 132 MMHG | WEIGHT: 129 LBS

## 2019-03-18 DIAGNOSIS — R26.89 MULTIFACTORIAL GAIT DISORDER: ICD-10-CM

## 2019-03-18 DIAGNOSIS — R53.1 WEAKNESS GENERALIZED: ICD-10-CM

## 2019-03-18 DIAGNOSIS — N39.0 URINARY TRACT INFECTION WITHOUT HEMATURIA, SITE UNSPECIFIED: ICD-10-CM

## 2019-03-18 PROCEDURE — 99309 SBSQ NF CARE MODERATE MDM 30: CPT | Performed by: NURSE PRACTITIONER

## 2019-03-18 NOTE — PROGRESS NOTES
Herberttania Dowling Jorge  : 4/15/1934  Age 80year old  male patient is admitted to [facility] [place] 3/2/19 for rehab and strengthening.     Admitted to [facility] AND [facility] on: 19 to 3/2/19     Chief complaint:Acute respiratory failure with hypoxia, pneumonia, ge Rfl: 0   metFORMIN HCl 500 MG Oral Tab Take 2 tablets (1,000 mg total) by mouth 2 (two) times daily with meals.  While on steroids then back to 500mg daily Disp:  Rfl: 0   metoprolol Tartrate 25 MG Oral Tab Take 25 mg by mouth 2 (two) times daily.   Disp: wt loss  ALLERGY/IMM. : per son Delona Daigle), nasal dripping may be exacerbated due to seasonal allergies        PHYSICAL EXAM:  GENERAL HEALTH: well developed, well nourished, in no apparent distress  LINES, TUBES, DRAINS:  none  SKIN: no rashes, no suspicious taper - completed  - saturating 98% on room air  - order tylenol 650 mg q6h PRN  - Pulm and RT consult to evaluate/treat PNA  - Will need f/u imaging to ensure resoluation of PNA.    - claritin 10 mg qd for nasal drainage     3. Acute metabolic encephalopat

## 2019-03-19 PROCEDURE — 99307 SBSQ NF CARE SF MDM 10: CPT | Performed by: INTERNAL MEDICINE

## 2019-03-20 ENCOUNTER — SNF VISIT (OUTPATIENT)
Dept: INTERNAL MEDICINE CLINIC | Facility: SKILLED NURSING FACILITY | Age: 84
End: 2019-03-20

## 2019-03-20 ENCOUNTER — EXTERNAL FACILITY (OUTPATIENT)
Dept: INTERNAL MEDICINE CLINIC | Facility: CLINIC | Age: 84
End: 2019-03-20

## 2019-03-20 VITALS
RESPIRATION RATE: 18 BRPM | BODY MASS INDEX: 21 KG/M2 | TEMPERATURE: 98 F | WEIGHT: 129.19 LBS | DIASTOLIC BLOOD PRESSURE: 70 MMHG | OXYGEN SATURATION: 92 % | SYSTOLIC BLOOD PRESSURE: 143 MMHG | HEART RATE: 60 BPM

## 2019-03-20 DIAGNOSIS — R26.89 MULTIFACTORIAL GAIT DISORDER: ICD-10-CM

## 2019-03-20 DIAGNOSIS — R53.1 WEAKNESS GENERALIZED: ICD-10-CM

## 2019-03-20 DIAGNOSIS — J96.01 ACUTE RESPIRATORY FAILURE WITH HYPOXIA (HCC): ICD-10-CM

## 2019-03-20 PROCEDURE — 99308 SBSQ NF CARE LOW MDM 20: CPT | Performed by: NURSE PRACTITIONER

## 2019-03-20 NOTE — PROGRESS NOTES
Rhianna Chambers  : 4/15/1934  Age 80year old  male patient is admitted to [place] [address] for rehabilitation and strengthening     Admitted to [facility] CLINICS on: 19 to 3/2/19     Chief complaint:Acute respiratory failure with hypoxia, pneumonia, gene eyes  HENT: normocephalic; normal nose, mucous membranes pink, moist, pharynx no exudate, no visible cerumen. Mild clear nasal drainage.   NECK: supple; FROM; no JVD, no TMG, no carotid bruits  BREAST: no masses, no nipple discharge, no nodes; normal skin scale , accucheck 142 on 03/20  - accuchecks tid  - cont glipizide 10 mg QD  - cont metformin 500mg QD     HTN  - monitor vitals, will monitor, /64 today  - cont amlodipine 5 mg QD, HTCZ 25 mg QD, and metoprolol 25 mg BID  - on potassium citrate 10 m

## 2019-03-25 ENCOUNTER — SNF VISIT (OUTPATIENT)
Dept: INTERNAL MEDICINE CLINIC | Facility: SKILLED NURSING FACILITY | Age: 84
End: 2019-03-25

## 2019-03-25 VITALS
TEMPERATURE: 98 F | DIASTOLIC BLOOD PRESSURE: 72 MMHG | HEART RATE: 71 BPM | BODY MASS INDEX: 23 KG/M2 | OXYGEN SATURATION: 97 % | WEIGHT: 142.63 LBS | SYSTOLIC BLOOD PRESSURE: 137 MMHG | RESPIRATION RATE: 20 BRPM

## 2019-03-25 DIAGNOSIS — R53.1 WEAKNESS GENERALIZED: ICD-10-CM

## 2019-03-25 DIAGNOSIS — N39.0 URINARY TRACT INFECTION WITHOUT HEMATURIA, SITE UNSPECIFIED: ICD-10-CM

## 2019-03-25 DIAGNOSIS — J96.01 ACUTE RESPIRATORY FAILURE WITH HYPOXIA (HCC): ICD-10-CM

## 2019-03-25 DIAGNOSIS — R26.89 MULTIFACTORIAL GAIT DISORDER: ICD-10-CM

## 2019-03-25 PROCEDURE — 99309 SBSQ NF CARE MODERATE MDM 30: CPT | Performed by: NURSE PRACTITIONER

## 2019-03-25 NOTE — PROGRESS NOTES
Mitchell Hu Mraz  : 4/15/1934  Age 80year old  male patient is admitted to [facility] on 3/2/19 for rehab and strengthening.     Admitted to [facility] on: 19 to 3/2/19     Chief complaint:Acute respiratory failure with hypoxia, pneumonia, FRANCOIS.     ALLERGIES:  No Known Allergies     CODE STATUS:  DNR     ADVANCED CARE PLANNING TEAM: will need family care plan, expected discharge 3/27/19     CURRENT MEDICATIONS -reviewed and updated     Current Outpatient Medications:  guaiFENesin  MG Or nocturia: no hematuria  MUSCULOSKELETAL:no joint complaints upper or lower extremities  NEURO:no sensory or motor complaint  PSYCHE: no symp  GI: denies ntoms of depression or anxiety  HEMATOLOGY:denies bruising, denies excessive bleeding  ENDOCRINE: denie taper, clindamycin 300 mg TID, levaquin 750 mg QD; total 9 days treatment  - Guaifenesin 600 mg q12h  - CBC/BMP weekly     2. Bibasilar PNA  - clindamycin and levaquin - completed  - Speech swallow study rec regular solids and thin liquids - tolerating wel

## 2019-03-26 ENCOUNTER — SNF VISIT (OUTPATIENT)
Dept: INTERNAL MEDICINE CLINIC | Facility: SKILLED NURSING FACILITY | Age: 84
End: 2019-03-26

## 2019-03-26 VITALS
OXYGEN SATURATION: 97 % | RESPIRATION RATE: 20 BRPM | HEART RATE: 74 BPM | DIASTOLIC BLOOD PRESSURE: 84 MMHG | TEMPERATURE: 97 F | SYSTOLIC BLOOD PRESSURE: 131 MMHG | WEIGHT: 142.69 LBS | BODY MASS INDEX: 23 KG/M2

## 2019-03-26 DIAGNOSIS — R53.1 WEAKNESS GENERALIZED: ICD-10-CM

## 2019-03-26 DIAGNOSIS — Z91.89 ASPIRATION PRECAUTIONS: ICD-10-CM

## 2019-03-26 DIAGNOSIS — R26.89 MULTIFACTORIAL GAIT DISORDER: ICD-10-CM

## 2019-03-26 DIAGNOSIS — Z02.9 DISCHARGE PLANNING ISSUES: ICD-10-CM

## 2019-03-26 PROCEDURE — 99309 SBSQ NF CARE MODERATE MDM 30: CPT | Performed by: NURSE PRACTITIONER

## 2019-03-26 NOTE — PROGRESS NOTES
Jatinder Russell Mraz  : 4/15/1934  Age 80year old  male patient is admitted to [facility] on 3/2/19 for rehab and strengthening.     Admitted to [facility] AND CLINICS on: 19 to 3/2/19     Chief complaint:Acute respiratory failure with hypoxia, pneumonia, ge needed, will discuss with Warner Jones apparently had thin liquids last nite with medications , so he refuses to take his meds with nectar thick today. Speech involved and reassessing him. Will have a repeat swallow study. Lungs appear clear.  Coughs a ti CARDIOVASCULAR:denies chest pain, no palpitations , denies syncope, denies orthopnea, nausea, vomiting, constipation, diarrhea; no rectal bleeding; no heartburn  :no dysuria, urgency or frequency; no epididymal or testicular pain; no penile discharge; chest in hospital    - CXR negative in hospital   - CT chest shows Bibasilar PNA cannot r/o aspiration.  No PE.    - respiratory panel neg in hospital   - weaned off O2 and saturating 98% on room air  - cont duonebs qshift and q6h PRN due to slight cough  -

## 2019-03-30 PROCEDURE — 99307 SBSQ NF CARE SF MDM 10: CPT | Performed by: INTERNAL MEDICINE

## 2019-03-31 ENCOUNTER — EXTERNAL FACILITY (OUTPATIENT)
Dept: INTERNAL MEDICINE CLINIC | Facility: CLINIC | Age: 84
End: 2019-03-31

## 2019-03-31 DIAGNOSIS — R26.89 MULTIFACTORIAL GAIT DISORDER: ICD-10-CM

## 2019-03-31 DIAGNOSIS — R53.1 WEAKNESS GENERALIZED: ICD-10-CM

## 2019-03-31 DIAGNOSIS — J96.01 ACUTE RESPIRATORY FAILURE WITH HYPOXIA (HCC): ICD-10-CM

## 2019-04-01 ENCOUNTER — SNF DISCHARGE (OUTPATIENT)
Dept: INTERNAL MEDICINE CLINIC | Facility: SKILLED NURSING FACILITY | Age: 84
End: 2019-04-01

## 2019-04-01 VITALS
SYSTOLIC BLOOD PRESSURE: 117 MMHG | OXYGEN SATURATION: 97 % | BODY MASS INDEX: 23 KG/M2 | WEIGHT: 142.63 LBS | RESPIRATION RATE: 20 BRPM | DIASTOLIC BLOOD PRESSURE: 76 MMHG | TEMPERATURE: 98 F | HEART RATE: 90 BPM

## 2019-04-01 DIAGNOSIS — J40 BRONCHITIS: ICD-10-CM

## 2019-04-01 DIAGNOSIS — R53.1 WEAKNESS GENERALIZED: ICD-10-CM

## 2019-04-01 DIAGNOSIS — R26.89 MULTIFACTORIAL GAIT DISORDER: ICD-10-CM

## 2019-04-01 DIAGNOSIS — N39.0 URINARY TRACT INFECTION WITHOUT HEMATURIA, SITE UNSPECIFIED: ICD-10-CM

## 2019-04-01 PROCEDURE — 99310 SBSQ NF CARE HIGH MDM 45: CPT | Performed by: NURSE PRACTITIONER

## 2019-04-01 NOTE — PROGRESS NOTES
Tavares Mchugh, 4/15/1934, 80year old, male is being discharged from [place] [address]   4/1/19    DISCHARGE SUMMARY    Date of Admission [facility]:2/25/19 to 3/2/19    Date of Discharge [facility]:3/2/19 to 4/1/19                                 Admitting Diagnose otherwise  SKIN: denies any unusual skin lesions or rashes  WOUNDS: n/a   EYES:no visual complaints or deficits  HENT: Son [facility] [place]) reports nasal dripping  RESPIRATORY: denies SOB or wheezing.    CARDIOVASCULAR:denies chest pain, no palpitations , denies syn calm, pleasant and cooperative        MEDICAL DECISION MAKING: son Taya Miller) is POA     SEE PLAN BELOW     1. Acute hypoxemic respiratory failure  - secondary to Bibasilar PNA.  Seen on CT chest in hospital    - CXR negative in hospital   - CT chest shows Bib APRN  4/1/2019  10:16 AM

## 2019-05-15 RX ORDER — POTASSIUM CITRATE 10 MEQ/1
TABLET, EXTENDED RELEASE ORAL
Qty: 60 TABLET | OUTPATIENT
Start: 2019-05-15

## 2019-05-15 NOTE — TELEPHONE ENCOUNTER
Are you refilling patient's medication ?   Hypertensive Medications  Protocol Criteria:  · Appointment scheduled in the past 6 months or in the next 3 months  · BMP or CMP in the past 12 months  · Creatinine result < 2  Recent Outpatient Visits            4

## 2019-05-16 RX ORDER — POTASSIUM CITRATE 10 MEQ/1
TABLET, EXTENDED RELEASE ORAL
Qty: 60 TABLET | OUTPATIENT
Start: 2019-05-16

## 2019-05-16 RX ORDER — AMLODIPINE BESYLATE 5 MG/1
TABLET ORAL
Qty: 90 TABLET | OUTPATIENT
Start: 2019-05-16

## 2019-05-22 RX ORDER — POTASSIUM CITRATE 10 MEQ/1
TABLET, EXTENDED RELEASE ORAL
Qty: 60 TABLET | OUTPATIENT
Start: 2019-05-22

## 2019-05-22 RX ORDER — AMLODIPINE BESYLATE 5 MG/1
TABLET ORAL
Qty: 90 TABLET | OUTPATIENT
Start: 2019-05-22

## 2019-06-05 RX ORDER — POTASSIUM CITRATE 10 MEQ/1
TABLET, EXTENDED RELEASE ORAL
Qty: 60 TABLET | OUTPATIENT
Start: 2019-06-05

## 2019-11-02 ENCOUNTER — APPOINTMENT (OUTPATIENT)
Dept: CT IMAGING | Facility: HOSPITAL | Age: 84
End: 2019-11-02
Attending: EMERGENCY MEDICINE
Payer: MEDICARE

## 2019-11-02 ENCOUNTER — HOSPITAL ENCOUNTER (EMERGENCY)
Facility: HOSPITAL | Age: 84
Discharge: HOME | End: 2019-11-02
Attending: EMERGENCY MEDICINE
Payer: MEDICARE

## 2019-11-02 VITALS
HEIGHT: 70 IN | HEART RATE: 80 BPM | BODY MASS INDEX: 20.52 KG/M2 | SYSTOLIC BLOOD PRESSURE: 137 MMHG | OXYGEN SATURATION: 98 % | DIASTOLIC BLOOD PRESSURE: 61 MMHG | RESPIRATION RATE: 18 BRPM | TEMPERATURE: 98 F | WEIGHT: 143.31 LBS

## 2019-11-02 DIAGNOSIS — E86.0 DEHYDRATION: ICD-10-CM

## 2019-11-02 DIAGNOSIS — R51.9 NONINTRACTABLE HEADACHE, UNSPECIFIED CHRONICITY PATTERN, UNSPECIFIED HEADACHE TYPE: Primary | ICD-10-CM

## 2019-11-02 DIAGNOSIS — N30.00 ACUTE CYSTITIS WITHOUT HEMATURIA: ICD-10-CM

## 2019-11-02 PROCEDURE — 96365 THER/PROPH/DIAG IV INF INIT: CPT

## 2019-11-02 PROCEDURE — 96375 TX/PRO/DX INJ NEW DRUG ADDON: CPT

## 2019-11-02 PROCEDURE — 70450 CT HEAD/BRAIN W/O DYE: CPT | Performed by: EMERGENCY MEDICINE

## 2019-11-02 PROCEDURE — 85025 COMPLETE CBC W/AUTO DIFF WBC: CPT | Performed by: EMERGENCY MEDICINE

## 2019-11-02 PROCEDURE — 81001 URINALYSIS AUTO W/SCOPE: CPT | Performed by: EMERGENCY MEDICINE

## 2019-11-02 PROCEDURE — 99284 EMERGENCY DEPT VISIT MOD MDM: CPT

## 2019-11-02 PROCEDURE — 87077 CULTURE AEROBIC IDENTIFY: CPT | Performed by: EMERGENCY MEDICINE

## 2019-11-02 PROCEDURE — 80048 BASIC METABOLIC PNL TOTAL CA: CPT | Performed by: EMERGENCY MEDICINE

## 2019-11-02 PROCEDURE — 87086 URINE CULTURE/COLONY COUNT: CPT | Performed by: EMERGENCY MEDICINE

## 2019-11-02 PROCEDURE — 96366 THER/PROPH/DIAG IV INF ADDON: CPT

## 2019-11-02 RX ORDER — CEPHALEXIN 500 MG/1
500 CAPSULE ORAL 2 TIMES DAILY
Qty: 14 CAPSULE | Refills: 0 | Status: SHIPPED | OUTPATIENT
Start: 2019-11-02 | End: 2019-11-09

## 2019-11-02 RX ORDER — KETOROLAC TROMETHAMINE 15 MG/ML
15 INJECTION, SOLUTION INTRAMUSCULAR; INTRAVENOUS ONCE
Status: COMPLETED | OUTPATIENT
Start: 2019-11-02 | End: 2019-11-02

## 2019-11-02 RX ORDER — SODIUM CHLORIDE 9 MG/ML
INJECTION, SOLUTION INTRAVENOUS CONTINUOUS
Status: DISCONTINUED | OUTPATIENT
Start: 2019-11-02 | End: 2019-11-02

## 2019-11-02 NOTE — ED NOTES
Received pt to room to cart. Son at bedside who explains Pt here for c/o HA that was not resolved at [facility] after tylenol. HA initally resolved upon arrival however once back to room pain mild at 2-3. Denies injury, n/v. No obvious signs of trauma.  No b

## 2019-11-02 NOTE — ED PROVIDER NOTES
Patient Seen in: [facility] AND [facility] Emergency Department    History   Patient presents with:  Headache (neurologic)    Stated Complaint: headache x3 days     HPI    79 yo M with PMH dementia, prostate CA, DM, HTN, HL, CVA on ASA therapy presenting for evalu Smoker      Smokeless tobacco: Never Used    Alcohol use: No    Drug use: No      Review of Systems :  Constitutional: As per HPI  Respiratory: Negative for cough and shortness of breath. Gastrointestinal: Negative for vomiting and abdominal pain.    Perfecto Presume WITH DIFFERENTIAL WITH PLATELET    Narrative: The following orders were created for panel order CBC WITH DIFFERENTIAL WITH PLATELET.   Procedure                               Abnormality         Status                     --------- present, consistent with chronic microvascular ischemic disease. Small chronic lacunar infarcts are demonstrated in the right thalamus and bilateral basal ganglia.  CEREBELLUM: A small chronic lacunar infarct of the right cerebellar hemisphere is re-identif derangement, UTI. Pulse ox: 98%:Normal on RA, as interpreted by myself    Evaluation for cephalgia without associated meningismus in neurologically intact patient without vision change or temporal tenderness. Labs nonacute, CTH unremarakble.  Symptoms im

## 2019-11-02 NOTE — ED INITIAL ASSESSMENT (HPI)
[facility]. Given tylenol at facility with no improvement in pain.

## 2021-07-05 ENCOUNTER — HOSPITAL ENCOUNTER (EMERGENCY)
Facility: HOSPITAL | Age: 86
Discharge: HOME | End: 2021-07-05
Attending: EMERGENCY MEDICINE
Payer: MEDICARE

## 2021-07-05 VITALS
RESPIRATION RATE: 18 BRPM | WEIGHT: 143.31 LBS | HEART RATE: 56 BPM | TEMPERATURE: 98 F | BODY MASS INDEX: 21 KG/M2 | DIASTOLIC BLOOD PRESSURE: 63 MMHG | SYSTOLIC BLOOD PRESSURE: 141 MMHG | OXYGEN SATURATION: 94 %

## 2021-07-05 DIAGNOSIS — M79.604 PAIN OF RIGHT LOWER EXTREMITY: Primary | ICD-10-CM

## 2021-07-05 LAB
ANION GAP SERPL CALC-SCNC: 7 MMOL/L (ref 0–18)
BASOPHILS # BLD AUTO: 0.04 X10(3) UL (ref 0–0.2)
BASOPHILS NFR BLD AUTO: 0.5 %
BUN BLD-MCNC: 16 MG/DL (ref 7–18)
BUN/CREAT SERPL: 28.6 (ref 10–20)
CALCIUM BLD-MCNC: 9.5 MG/DL (ref 8.5–10.1)
CHLORIDE SERPL-SCNC: 105 MMOL/L (ref 98–112)
CO2 SERPL-SCNC: 27 MMOL/L (ref 21–32)
CREAT BLD-MCNC: 0.56 MG/DL
DEPRECATED RDW RBC AUTO: 44.6 FL (ref 35.1–46.3)
EOSINOPHIL # BLD AUTO: 0.17 X10(3) UL (ref 0–0.7)
EOSINOPHIL NFR BLD AUTO: 2.3 %
ERYTHROCYTE [DISTWIDTH] IN BLOOD BY AUTOMATED COUNT: 13.7 % (ref 11–15)
GLUCOSE BLD-MCNC: 116 MG/DL (ref 70–99)
HCT VFR BLD AUTO: 40.8 %
HGB BLD-MCNC: 13.3 G/DL
IMM GRANULOCYTES # BLD AUTO: 0.04 X10(3) UL (ref 0–1)
IMM GRANULOCYTES NFR BLD: 0.5 %
LYMPHOCYTES # BLD AUTO: 1.65 X10(3) UL (ref 1–4)
LYMPHOCYTES NFR BLD AUTO: 22 %
MCH RBC QN AUTO: 29.2 PG (ref 26–34)
MCHC RBC AUTO-ENTMCNC: 32.6 G/DL (ref 31–37)
MCV RBC AUTO: 89.5 FL
MONOCYTES # BLD AUTO: 0.55 X10(3) UL (ref 0.1–1)
MONOCYTES NFR BLD AUTO: 7.3 %
NEUTROPHILS # BLD AUTO: 5.04 X10 (3) UL (ref 1.5–7.7)
NEUTROPHILS # BLD AUTO: 5.04 X10(3) UL (ref 1.5–7.7)
NEUTROPHILS NFR BLD AUTO: 67.4 %
OSMOLALITY SERPL CALC.SUM OF ELEC: 290 MOSM/KG (ref 275–295)
PLATELET # BLD AUTO: 178 10(3)UL (ref 150–450)
POTASSIUM SERPL-SCNC: 4 MMOL/L (ref 3.5–5.1)
RBC # BLD AUTO: 4.56 X10(6)UL
SODIUM SERPL-SCNC: 139 MMOL/L (ref 136–145)
WBC # BLD AUTO: 7.5 X10(3) UL (ref 4–11)

## 2021-07-05 PROCEDURE — 80048 BASIC METABOLIC PNL TOTAL CA: CPT | Performed by: EMERGENCY MEDICINE

## 2021-07-05 PROCEDURE — 96374 THER/PROPH/DIAG INJ IV PUSH: CPT

## 2021-07-05 PROCEDURE — 85025 COMPLETE CBC W/AUTO DIFF WBC: CPT | Performed by: EMERGENCY MEDICINE

## 2021-07-05 PROCEDURE — 99285 EMERGENCY DEPT VISIT HI MDM: CPT

## 2021-07-05 PROCEDURE — 96375 TX/PRO/DX INJ NEW DRUG ADDON: CPT

## 2021-07-05 RX ORDER — MORPHINE SULFATE 4 MG/ML
2 INJECTION, SOLUTION INTRAMUSCULAR; INTRAVENOUS ONCE
Status: COMPLETED | OUTPATIENT
Start: 2021-07-05 | End: 2021-07-05

## 2021-07-05 RX ORDER — ACETAMINOPHEN 500 MG
1000 TABLET ORAL ONCE
Status: COMPLETED | OUTPATIENT
Start: 2021-07-05 | End: 2021-07-05

## 2021-07-05 RX ORDER — DIAZEPAM 2 MG/1
2 TABLET ORAL 3 TIMES DAILY PRN
Qty: 10 TABLET | Refills: 0 | Status: SHIPPED | OUTPATIENT
Start: 2021-07-05 | End: 2021-07-05

## 2021-07-05 RX ORDER — DIAZEPAM 2 MG/1
2 TABLET ORAL 3 TIMES DAILY PRN
Qty: 10 TABLET | Refills: 0 | Status: SHIPPED | OUTPATIENT
Start: 2021-07-05 | End: 2021-07-12

## 2021-07-05 RX ORDER — DIAZEPAM 5 MG/ML
2.5 INJECTION, SOLUTION INTRAMUSCULAR; INTRAVENOUS ONCE
Status: COMPLETED | OUTPATIENT
Start: 2021-07-05 | End: 2021-07-05

## 2021-07-05 NOTE — CM/SW NOTE
Dr. Denisa Mclaughlin requesting PT and OT for patient. HH referral sent in [address] [place] [address].  Adrianne Lambert, 07/05/21, 4:24 PM    PCP noted on facesheet to be: Dr. Antonio Colunga [phone]

## 2021-07-05 NOTE — ED INITIAL ASSESSMENT (HPI)
Patient is a/ox1 (baseline)    Sent from [facility] for atraumatic right leg pain reported by NH staff to EMS.   Patient currently denies any pain

## 2021-07-05 NOTE — ED QUICK NOTES
ASSUMED CARE TO THIS PT , RECEIVED REPORT FROM MALINDA SCHROEDER. PER REPORT GIVEN PT CAME IN FOR RIGHT LEG PAIN, DENIES TRAUMA. PT IS A/O X 1 HIS NORM/ BASELINE. PT'S SON POA AT BEDSIDE.   WILL CONTINUE TO MONITOR PT.

## 2021-07-05 NOTE — ED PROVIDER NOTES
Patient Seen in: [facility] AND [facility] Emergency Department      History   Patient presents with:  Pain    Stated Complaint: right leg pain    HPI/Subjective:   HPI    41-year-old male with history of hypertension, diabetes, hyperlipidemia, prior CVA, lior auscultation  Cardiovascular: regular rate and rhythm  Gastrointestinal:  abdomen is soft and non tender, no masses, bowel sounds normal  Neurological: Motor and sensation is intact and symmetric to bilateral lower extremities although the patient is holdi [id number]  [phone]          We recommend that you schedule follow up care with a primary care provider within the next three months to obtain basic health screening including reassessment of your blood pressure.       Medications Prescribed:  Current Disch

## 2021-07-06 NOTE — HOME CARE LIAISON
Called patient' son, as patient discharged from the ER. Patient's son's choice is [facility]. Agency reserved in [address] and financial interest disclosure provided to patient.

## 2021-07-06 NOTE — CM/SW NOTE
KEVIN checked Aidin - [facility] accepted - KEVIN reserved [facility] in [address] [place] [address].  ERCM sent message to [facility] asking when they can expect to contact patient so we can notify patient - awaiting response from [facility]. Once [facility] resp

## 2021-07-07 ENCOUNTER — APPOINTMENT (OUTPATIENT)
Dept: CT IMAGING | Facility: HOSPITAL | Age: 86
End: 2021-07-07
Attending: EMERGENCY MEDICINE
Payer: MEDICARE

## 2021-07-07 ENCOUNTER — APPOINTMENT (OUTPATIENT)
Dept: GENERAL RADIOLOGY | Facility: HOSPITAL | Age: 86
End: 2021-07-07
Attending: EMERGENCY MEDICINE
Payer: MEDICARE

## 2021-07-07 ENCOUNTER — HOSPITAL ENCOUNTER (EMERGENCY)
Facility: HOSPITAL | Age: 86
Discharge: HOME | End: 2021-07-08
Attending: EMERGENCY MEDICINE
Payer: MEDICARE

## 2021-07-07 VITALS
SYSTOLIC BLOOD PRESSURE: 144 MMHG | OXYGEN SATURATION: 99 % | WEIGHT: 160 LBS | RESPIRATION RATE: 18 BRPM | BODY MASS INDEX: 22.4 KG/M2 | HEART RATE: 53 BPM | TEMPERATURE: 99 F | DIASTOLIC BLOOD PRESSURE: 67 MMHG | HEIGHT: 71 IN

## 2021-07-07 DIAGNOSIS — R25.2 LEG CRAMPS: Primary | ICD-10-CM

## 2021-07-07 PROCEDURE — 96372 THER/PROPH/DIAG INJ SC/IM: CPT

## 2021-07-07 PROCEDURE — 73700 CT LOWER EXTREMITY W/O DYE: CPT | Performed by: EMERGENCY MEDICINE

## 2021-07-07 PROCEDURE — 99284 EMERGENCY DEPT VISIT MOD MDM: CPT

## 2021-07-07 PROCEDURE — 73502 X-RAY EXAM HIP UNI 2-3 VIEWS: CPT | Performed by: EMERGENCY MEDICINE

## 2021-07-07 RX ORDER — MORPHINE SULFATE 4 MG/ML
4 INJECTION, SOLUTION INTRAMUSCULAR; INTRAVENOUS ONCE
Status: COMPLETED | OUTPATIENT
Start: 2021-07-07 | End: 2021-07-07

## 2021-07-07 RX ORDER — DIAZEPAM 5 MG/1
2.5 TABLET ORAL ONCE
Status: COMPLETED | OUTPATIENT
Start: 2021-07-07 | End: 2021-07-07

## 2021-07-07 RX ORDER — ACETAMINOPHEN 500 MG
1000 TABLET ORAL ONCE
Status: COMPLETED | OUTPATIENT
Start: 2021-07-07 | End: 2021-07-07

## 2021-07-08 NOTE — ED PROVIDER NOTES
Patient Seen in: [facility] AND [facility] Emergency Department      History   Patient presents with:  Leg or Foot Injury    Stated Complaint: LEG PAIN    HPI/Subjective:   HPI    81 y/o male here 2 days ago for reported right leg cramping prescribed Valium whic of either joint. Dorsalis pedis pulses intact. The left leg seems unaffected. Neurological: Alert and oriented to person, place, and time. Skin: Skin is warm and dry. Nursing note and vitals reviewed.     Differential diagnosis includes leg spasm and information is transmitted to the [facility] NRDR ([address]) which includes the Dose Index Registry. FINDINGS:  BONES: Bones are extremely demineralized diffusely.   Within the limitations this poses, no bam related to ER  Lalitha at 0919 hours on 07/08/2021 at number at #[id number].      Dictated by (CST): Neil Alston MD on 7/08/2021 at 9:06 AM     Finalized by (CST): Neil Alston MD on 7/08/2021 at 9:23 AM                 MDM      Labs done 2 days ago

## (undated) NOTE — IP AVS SNAPSHOT
Patient Demographics     Address  [address] [place] [id number] [address] [address] Phone  [phone] [facility])  [phone] [facility]      Emergency Contact(s)     Name Relation Home Work Gia Giraldo [phone] levofloxacin 750 MG Tabs  Commonly known as:  LEVAQUIN  Start taking on:  3/3/2019  Next dose due: Tomorrow 3/3/19      Take 1 tablet (750 mg total) by mouth daily for 6 days.   Stop taking on:  3/9/2019   Sonya Nuñez MD         metFORMIN HCl 500 MG Ta [id number] Pentoxifylline ER (TRENTAL) CR tab 400 mg 03/01/19 2111 Given      [id number] Pentoxifylline ER (TRENTAL) CR tab 400 mg 03/02/19 0922 Given      [id number] amLODIPine Besylate (NORVASC) tab 5 mg 03/02/19 0921 Given      [id number] atorvastatin (LIP [id number] Insulin Aspart Pen (NOVOLOG) 100 UNIT/ML flexpen 8 Units 03/01/19 1754 Given            RIGHT LOWER ABDOMEN     Order ID Medication Name Action Time Action Reason Comments    [id number] Heparin Sodium (Porcine) 5000 UNIT/ML injection 5,000 Units Creatinine 0.84 0.70 - 1.30 mg/dL [employer] [facility]   BUN/CREA Ratio 19.0 10.0 - 20.0 — [facility]   Calcium, Total 8.6 8.5 - 10.1 mg/dL [facility] [facility]   Calculated Osmolality 290 275 - 295 mOsm/kg — [facility]   GFR, Non-African American 80 >=60 — [facility] Specimen:  Other from [facility]      MRSA Screen By PCR Negative    Respiratory Panel FLU expanded Once [[id number]]  (Normal) Collected:  02/25/19 3729    Order Status:  Completed Lab Status:  Final result Updated:  02/25/19 8518    Specimen:  Other from [employer] Urinalysis still pending. Upon arrival to the emergency room, the patient as saturating 88% to 90% on room air, started on oxygen, and he will be admitted to the hospital for further management.       PAST MEDICAL HISTORY:  The patient had history of diabe ABDOMEN:  Soft, nondistended, no tenderness. Positive bowel sounds. EXTREMITIES:  No peripheral edema, clubbing, or cyanosis. NEUROLOGIC:  No focal defects noted.   The patient is not able to participate in a full neurological exam.      ASSESSMENT AND Patient is a 12-year-old male who presented with chief complaint of increased lethargy and cough. Patient is a poor historian with underlying dementia. Found to have evidence of hypoxia on presentation. Admits to ongoing cough.   States dyspnea is improv acetaminophen (TYLENOL) tab 650 mg 650 mg Oral Q6H PRN   ondansetron HCl (ZOFRAN) injection 4 mg 4 mg Intravenous Q6H PRN   guaiFENesin (ROBITUSSIN) 100 MG/5ML solution 200 mg 200 mg Oral Q4H PRN   ipratropium-albuterol (DUONEB) nebulizer solution 3 mL 3 m lb (68 kg), SpO2 90 %.     Constitutional: no acute distress  HEENT: PERRL  Cardio: RRR, S1 S2  Respiratory: Basilar crackles present  GI: abdomen soft, non tender  Extremities: no clubbing, cyanosis, edema  Neurologic: no gross motor deficits  Skin: warm, -CT chest with evidence of bibasilar consolidation seen with concern for possible aspiration pneumonia. No evidence of pulmonary embolism seen.   Emphysematous changes seen.  -Agreeable with IV antibiotic therapy at this time  -Wean oxygen as tolerated  -C as saturating 88% to 90% on room air, started on oxygen, and he will be admitted to the hospital for further management.[AP.2]      Hospital Course:[AP.1]    Acute hypoxemic respiratory failure  - secondary to Bibasilar PNA.  Seen on CT chest.   - CXR negat Peña Wasserman MD on 3/01/2019 at 10:42            Discharge Instructions     Medication List      START taking these medications    Clindamycin HCl 300 MG Caps  Commonly known as:  CLEOCIN  Take 1 capsule (300 mg total) by mouth 3 (three) times daily Follow-up Information     Deacon Quach In 1 week.     Specialty:  [facility] information:  [address] [address]  [phone]                   -PCP in [x] within 7 days [] within 14 days [] other     Disposition:[AP.1] seal across all trials. No anterior loss of bolus on any consistency given. Bolus formation and a-p propulsion appeared functional and timely. Mastication of solid WFL. Pt with reflexive cough following thin liquid trials x2.  No further clinical signs of a BSSE (clinical evaluation) including Slow rate, Small bites, Small sips, Multiple swallows, Alternate liquids/solids, No straws, Supervision with meals with 1:1 supervison  assistance 100 % of the time across 2 sessions.     SLP assisted with cup holding an

## (undated) NOTE — ED AVS SNAPSHOT
Emiliano Riddle   MRN: [id number]    Department:  [facility] Emergency Department   Date of Visit:  2/11/2019           Disclosure     Insurance plans vary and the physician(s) referred by the ER may not be covered by your plan.  Please contact your within the next three months to obtain basic health screening including reassessment of your blood pressure.     IF THERE IS ANY CHANGE OR WORSENING OF YOUR CONDITION, CALL YOUR PRIMARY CARE PHYSICIAN AT ONCE OR RETURN IMMEDIATELY TO THE EMERGENCY DEPARTMEN

## (undated) NOTE — IP AVS SNAPSHOT
[facility]            (For Outpatient Use Only) Initial Admit Date: 2/25/2019   Inpt/Obs Admit Date: Inpt: 2/25/19 / Obs: N/A   Discharge Date:    Erika Hook:  [de-identified]   MRN: [de-identified]   CSN: [id number]        [facility] Subscriber ID:  Pt Rel to Subscriber:    Hospital Account Financial Class: Medicare    March 2, 2019